# Patient Record
Sex: FEMALE | Race: WHITE | NOT HISPANIC OR LATINO | Employment: FULL TIME | URBAN - NONMETROPOLITAN AREA
[De-identification: names, ages, dates, MRNs, and addresses within clinical notes are randomized per-mention and may not be internally consistent; named-entity substitution may affect disease eponyms.]

---

## 2017-10-03 ENCOUNTER — OFFICE VISIT (OUTPATIENT)
Dept: FAMILY MEDICINE | Facility: OTHER | Age: 22
End: 2017-10-03
Attending: FAMILY MEDICINE
Payer: COMMERCIAL

## 2017-10-03 VITALS
DIASTOLIC BLOOD PRESSURE: 56 MMHG | BODY MASS INDEX: 17.56 KG/M2 | WEIGHT: 109.25 LBS | HEIGHT: 66 IN | SYSTOLIC BLOOD PRESSURE: 104 MMHG | TEMPERATURE: 98 F | OXYGEN SATURATION: 100 % | HEART RATE: 97 BPM

## 2017-10-03 DIAGNOSIS — Z23 NEED FOR PROPHYLACTIC VACCINATION AND INOCULATION AGAINST INFLUENZA: Primary | ICD-10-CM

## 2017-10-03 DIAGNOSIS — B35.4 TINEA CORPORIS: ICD-10-CM

## 2017-10-03 DIAGNOSIS — Z00.00 ROUTINE GENERAL MEDICAL EXAMINATION AT A HEALTH CARE FACILITY: ICD-10-CM

## 2017-10-03 DIAGNOSIS — D22.9 MULTIPLE ATYPICAL NEVI: ICD-10-CM

## 2017-10-03 DIAGNOSIS — N76.0 ABSCESS OF VAGINA: ICD-10-CM

## 2017-10-03 DIAGNOSIS — N94.3 PREMENSTRUAL SYNDROME: ICD-10-CM

## 2017-10-03 PROCEDURE — 90471 IMMUNIZATION ADMIN: CPT | Performed by: FAMILY MEDICINE

## 2017-10-03 PROCEDURE — 99395 PREV VISIT EST AGE 18-39: CPT | Mod: 25 | Performed by: FAMILY MEDICINE

## 2017-10-03 PROCEDURE — 90686 IIV4 VACC NO PRSV 0.5 ML IM: CPT | Performed by: FAMILY MEDICINE

## 2017-10-03 RX ORDER — MULTIVITAMIN WITH MINERALS
TABLET ORAL
Qty: 540 TABLET | Refills: 3 | Status: SHIPPED | OUTPATIENT
Start: 2017-10-03 | End: 2019-05-23

## 2017-10-03 RX ORDER — KETOCONAZOLE 20 MG/G
CREAM TOPICAL 2 TIMES DAILY
Qty: 15 G | Refills: 0 | Status: SHIPPED | OUTPATIENT
Start: 2017-10-03 | End: 2019-05-23

## 2017-10-03 RX ORDER — ERGOCALCIFEROL 1.25 MG/1
50000 CAPSULE, LIQUID FILLED ORAL
Qty: 8 CAPSULE | Refills: 0 | Status: SHIPPED | OUTPATIENT
Start: 2017-10-03 | End: 2017-11-22

## 2017-10-03 ASSESSMENT — ANXIETY QUESTIONNAIRES
2. NOT BEING ABLE TO STOP OR CONTROL WORRYING: NOT AT ALL
GAD7 TOTAL SCORE: 0
3. WORRYING TOO MUCH ABOUT DIFFERENT THINGS: NOT AT ALL
5. BEING SO RESTLESS THAT IT IS HARD TO SIT STILL: NOT AT ALL
1. FEELING NERVOUS, ANXIOUS, OR ON EDGE: NOT AT ALL
4. TROUBLE RELAXING: NOT AT ALL
IF YOU CHECKED OFF ANY PROBLEMS ON THIS QUESTIONNAIRE, HOW DIFFICULT HAVE THESE PROBLEMS MADE IT FOR YOU TO DO YOUR WORK, TAKE CARE OF THINGS AT HOME, OR GET ALONG WITH OTHER PEOPLE: NOT DIFFICULT AT ALL
7. FEELING AFRAID AS IF SOMETHING AWFUL MIGHT HAPPEN: NOT AT ALL
6. BECOMING EASILY ANNOYED OR IRRITABLE: NOT AT ALL

## 2017-10-03 ASSESSMENT — PATIENT HEALTH QUESTIONNAIRE - PHQ9: SUM OF ALL RESPONSES TO PHQ QUESTIONS 1-9: 0

## 2017-10-03 NOTE — NURSING NOTE
"Chief Complaint   Patient presents with     Physical       Initial /56 (BP Location: Right arm, Patient Position: Chair, Cuff Size: Adult Regular)  Pulse 97  Temp 98  F (36.7  C)  Ht 5' 5.75\" (1.67 m)  Wt 109 lb 4 oz (49.6 kg)  LMP 09/20/2017 (Approximate)  SpO2 100%  BMI 17.77 kg/m2 Estimated body mass index is 17.77 kg/(m^2) as calculated from the following:    Height as of this encounter: 5' 5.75\" (1.67 m).    Weight as of this encounter: 109 lb 4 oz (49.6 kg).  Medication Reconciliation: complete     Ludy Kelley    "

## 2017-10-03 NOTE — PROGRESS NOTES
Injectable Influenza Immunization Documentation    1.  Is the person to be vaccinated sick today?   No    2. Does the person to be vaccinated have an allergy to a component   of the vaccine?   No    3. Has the person to be vaccinated ever had a serious reaction   to influenza vaccine in the past?   No    4. Has the person to be vaccinated ever had Guillain-Barré syndrome?   No    Form completed by Ludy Kelley            SUBJECTIVE:   CC: Evangelina Otero is an 22 year old woman who presents for preventive health visit.     Healthy Habits:    Do you get at least three servings of calcium containing foods daily (dairy, green leafy vegetables, etc.)? yes    Amount of exercise or daily activities, outside of work: no exercise, sometimes will walk but not much lately    Problems taking medications regularly No    Medication side effects: No    Have you had an eye exam in the past two years? unknown    Do you see a dentist twice per year? yes    Do you have sleep apnea, excessive snoring or daytime drowsiness?no          Rash       Duration: 4-5 mos    Description (location/character/radiation): abdomen    Intensity:  mild    Accompanying signs and symptoms: changing    History (similar episodes/previous evaluation): None    Precipitating or alleviating factors: None    Therapies tried and outcome: None         Today's PHQ-2 Score: No flowsheet data found.      Abuse: Current or Past(Physical, Sexual or Emotional)- No  Do you feel safe in your environment - No  Social History   Substance Use Topics     Smoking status: Never Smoker     Smokeless tobacco: Never Used     Alcohol use No     The patient does not drink >3 drinks per day nor >7 drinks per week.    Reviewed orders with patient.  Reviewed health maintenance and updated orders accordingly - Yes  Labs reviewed in EPIC  BP Readings from Last 3 Encounters:   10/03/17 104/56   06/21/16 116/70   06/02/16 110/60    Wt Readings from Last 3 Encounters:   10/03/17 109 lb  4 oz (49.6 kg)   06/21/16 105 lb (47.6 kg)   06/02/16 110 lb (49.9 kg)                  Patient Active Problem List   Diagnosis     Hypovitaminosis D     ACP (advance care planning)     Multiple atypical nevi     Routine general medical examination at a health care facility     Premenstrual syndrome     Past Surgical History:   Procedure Laterality Date     FOOT SURGERY  [id number]    bunions/hammer toes/reconstruction to right foot     SIGMOIDOSCOPY,DIAGNOSTIC  03/16/2007     UPPER GI ENDOSCOPY  03/16/2007       Social History   Substance Use Topics     Smoking status: Never Smoker     Smokeless tobacco: Never Used     Alcohol use No     Family History   Problem Relation Age of Onset     Asthma Mother      GASTROINTESTINAL DISEASE Mother      ulcers     Immunodeficiency Mother      fibromyalgia     Allergies Father      Cardiovascular Paternal Grandfather      Liver Disease Paternal Grandfather      CANCER Maternal Grandmother      ovarian?     Lung Cancer Maternal Grandfather      +tobacco     Family History Negative Sister      Family History Negative Paternal Grandmother          Current Outpatient Prescriptions   Medication Sig Dispense Refill     chlorhexidine (HIBICLENS) 4 % liquid Apply daily to affected area 236 mL 11     Multiple Vitamins-Minerals (OPTIVITE P.M.T.) TABS Take 3 tablets PO  tablet 3     ketoconazole (NIZORAL) 2 % cream Apply topically 2 times daily Use one week beyond resolution 15 g 0     vitamin D (ERGOCALCIFEROL) 29774 UNIT capsule Take 1 capsule (50,000 Units) by mouth every 7 days for 8 doses 8 capsule 0     Cholecalciferol (VITAMIN D) 1000 UNITS capsule Take 1 capsule by mouth daily 4 times weekly             Mammogram not appropriate for this patient based on age.    Pertinent mammograms are reviewed under the imaging tab.  History of abnormal Pap smear: NO - age 21-29 PAP every 3 years recommended    Reviewed and updated as needed this visit by clinical staffTobacco  Allergies  " Meds  Med Hx  Surg Hx  Fam Hx  Soc Hx        Reviewed and updated as needed this visit by Provider        Past Medical History:   Diagnosis Date     Chronic abdominal pain     Probable IBS     Chronic urticaria      Contact dermatitis and other eczema, due to unspecified cause      Hypermetropia     minimum hyperopia. Does not need glasses     Unspecified constipation      Vomiting alone     Recurrent abdominal problems. Milk free diet. Tigan suppositories given.      Past Surgical History:   Procedure Laterality Date     FOOT SURGERY  [id number]    bunions/hammer toes/reconstruction to right foot     SIGMOIDOSCOPY,DIAGNOSTIC  2007     UPPER GI ENDOSCOPY  2007     Obstetric History       T0      L0     SAB0   TAB0   Ectopic0   Multiple0   Live Births0           ROS:  C: NEGATIVE for fever, chills, change in weight  I: NEGATIVE for worrisome rashes, moles or lesions  E: NEGATIVE for vision changes or irritation  ENT: NEGATIVE for ear, mouth and throat problems  R: NEGATIVE for significant cough or SOB  B: NEGATIVE for masses, tenderness or discharge  CV: NEGATIVE for chest pain, palpitations or peripheral edema  GI: NEGATIVE for nausea, abdominal pain, heartburn, or change in bowel habits  : NEGATIVE for unusual urinary or vaginal symptoms. Periods are regular.  M: NEGATIVE for significant arthralgias or myalgia  N: NEGATIVE for weakness, dizziness or paresthesias  P: NEGATIVE for changes in mood or affect    OBJECTIVE:   /56 (BP Location: Right arm, Patient Position: Chair, Cuff Size: Adult Regular)  Pulse 97  Temp 98  F (36.7  C)  Ht 5' 5.75\" (1.67 m)  Wt 109 lb 4 oz (49.6 kg)  LMP 2017 (Approximate)  SpO2 100%  BMI 17.77 kg/m2  EXAM:  GENERAL: healthy, alert and no distress  EYES: Eyes grossly normal to inspection, PERRL and conjunctivae and sclerae normal  HENT: ear canals and TM's normal, nose and mouth without ulcers or lesions  NECK: no adenopathy, no " "asymmetry, masses, or scars and thyroid normal to palpation  RESP: lungs clear to auscultation - no rales, rhonchi or wheezes  BREAST: normal without masses, tenderness or nipple discharge and no palpable axillary masses or adenopathy  CV: regular rate and rhythm, normal S1 S2, no S3 or S4, no murmur, click or rub, no peripheral edema and peripheral pulses strong  ABDOMEN: soft, nontender, no hepatosplenomegaly, no masses and bowel sounds normal  MS: no gross musculoskeletal defects noted, no edema  SKIN: no suspicious lesions or rashes  NEURO: Normal strength and tone, mentation intact and speech normal  PSYCH: mentation appears normal, affect normal/bright    ASSESSMENT/PLAN:   1. Need for prophylactic vaccination and inoculation against influenza    - HC FLU VAC PRESRV FREE QUAD SPLIT VIR 3+YRS IM  - Vaccine Administration, Initial [55309]    2. Premenstrual syndrome    - Multiple Vitamins-Minerals (OPTIVITE P.M.T.) TABS; Take 3 tablets PO BID  Dispense: 540 tablet; Refill: 3  - vitamin D (ERGOCALCIFEROL) 97243 UNIT capsule; Take 1 capsule (50,000 Units) by mouth every 7 days for 8 doses  Dispense: 8 capsule; Refill: 0    3. Abscess of vagina    - chlorhexidine (HIBICLENS) 4 % liquid; Apply daily to affected area  Dispense: 236 mL; Refill: 11    4. Tinea corporis    - ketoconazole (NIZORAL) 2 % cream; Apply topically 2 times daily Use one week beyond resolution  Dispense: 15 g; Refill: 0    5. Routine general medical examination at a health care facility      6. Multiple atypical nevi        COUNSELING:   Reviewed preventive health counseling, as reflected in patient instructions  Special attention given to:        Regular exercise       Healthy diet/nutrition       Vision screening       Hearing screening         reports that she has never smoked. She has never used smokeless tobacco.    Estimated body mass index is 17.77 kg/(m^2) as calculated from the following:    Height as of this encounter: 5' 5.75\" (1.67 " m).    Weight as of this encounter: 109 lb 4 oz (49.6 kg).         Counseling Resources:  ATP IV Guidelines  Pooled Cohorts Equation Calculator  Breast Cancer Risk Calculator  FRAX Risk Assessment  ICSI Preventive Guidelines  Dietary Guidelines for Americans, 2010  USDA's MyPlate  ASA Prophylaxis  Lung CA Screening    Meena Wright MD  [facility]

## 2017-10-03 NOTE — MR AVS SNAPSHOT
After Visit Summary   10/3/2017    Evangelina Otero    MRN: [id number]           Patient Information     Date Of Birth          1995        Visit Information        Provider Department      10/3/2017 1:30 PM Meena Wright MD [facility]        Today's Diagnoses     Need for prophylactic vaccination and inoculation against influenza    -  1    Premenstrual syndrome        Abscess of vagina        Tinea corporis        Routine general medical examination at a health care facility        Multiple atypical nevi          Care Instructions      Preventive Health Recommendations  Female Ages 18 to 25     Yearly exam:     See your health care provider every year in order to  o Review health changes.   o Discuss preventive care.    o Review your medicines if your doctor has prescribed any.      You should be tested each year for STDs (sexually transmitted diseases).       After age 20, talk to your provider about how often you should have cholesterol testing.      Starting at age 21, get a Pap test every three years. If you have an abnormal result, your doctor may have you test more often.      If you are at risk for diabetes, you should have a diabetes test (fasting glucose).     Shots:     Get a flu shot each year.     Get a tetanus shot every 10 years.     Consider getting the shot (vaccine) that prevents cervical cancer (Gardasil).    Nutrition:     Eat at least 5 servings of fruits and vegetables each day.    Eat whole-grain bread, whole-wheat pasta and brown rice instead of white grains and rice.    Talk to your provider about Calcium and Vitamin D.     Lifestyle    Exercise at least 150 minutes a week each week (30 minutes a day, 5 days a week). This will help you control your weight and prevent disease.    Limit alcohol to one drink per day.    No smoking.     Wear sunscreen to prevent skin cancer.    See your dentist every six months for an exam and cleaning.          Follow-ups  "after your visit        Your next 10 appointments already scheduled     Oct 17, 2017 11:30 AM CDT   (Arrive by 11:15 AM)   PROCEDURE with Meena Wright MD   [facility] [place] ([facility] [place] )    [address]  [place] [address]   [phone]            2018  2:15 PM CST   (Arrive by 2:00 PM)   SHORT with Meena Wright MD   [facility] [place] ([facility] [place] )    [address]  [place] [address]   [phone]              Who to contact     If you have questions or need follow up information about today's clinic visit or your schedule please contact [facility] directly at [phone].  Normal or non-critical lab and imaging results will be communicated to you by MyChart, letter or phone within 4 business days after the clinic has received the results. If you do not hear from us within 7 days, please contact the clinic through MyChart or phone. If you have a critical or abnormal lab result, we will notify you by phone as soon as possible.  Submit refill requests through [facility] or call your pharmacy and they will forward the refill request to us. Please allow 3 business days for your refill to be completed.          Additional Information About Your Visit        [employer] Information     [facility] lets you send messages to your doctor, view your test results, renew your prescriptions, schedule appointments and more. To sign up, go to [email or web address] . Click on \"Log in\" on the left side of the screen, which will take you to the Welcome page. Then click on \"Sign up Now\" on the right side of the page.     You will be asked to enter the access code listed below, as well as some personal information. Please follow the directions to create your username and password.     Your access code is: [email or web address]  Expires: 2018  2:07 PM     Your access code will  in 90 days. If you need help or a new code, please call your " "[facility] or [phone].        Care EveryWhere ID     This is your Care EveryWhere ID. This could be used by other organizations to access your [facility] medical records  [id number]        Your Vitals Were     Pulse Temperature Height Last Period Pulse Oximetry BMI (Body Mass Index)    97 98  F (36.7  C) 5' 5.75\" (1.67 m) 09/20/2017 (Approximate) 100% 17.77 kg/m2       Blood Pressure from Last 3 Encounters:   10/03/17 104/56   06/21/16 116/70   06/02/16 110/60    Weight from Last 3 Encounters:   10/03/17 109 lb 4 oz (49.6 kg)   06/21/16 105 lb (47.6 kg)   06/02/16 110 lb (49.9 kg)              We Performed the Following     HC FLU VAC PRESRV FREE QUAD SPLIT VIR 3+YRS IM     Vaccine Administration, Initial [[id number]]          Today's Medication Changes          These changes are accurate as of: 10/3/17  2:07 PM.  If you have any questions, ask your nurse or doctor.               Start taking these medicines.        Dose/Directions    ketoconazole 2 % cream   Commonly known as:  NIZORAL   Used for:  Tinea corporis   Started by:  Meena Wright MD        Apply topically 2 times daily Use one week beyond resolution   Quantity:  15 g   Refills:  0       OPTIVITE P.M.T. Tabs   Used for:  Premenstrual syndrome   Started by:  Meena Wright MD        Take 3 tablets PO BID   Quantity:  540 tablet   Refills:  3       vitamin D 68606 UNIT capsule   Commonly known as:  ERGOCALCIFEROL   Used for:  Premenstrual syndrome   Started by:  Meena Wright MD        Dose:  38999 Units   Take 1 capsule (50,000 Units) by mouth every 7 days for 8 doses   Quantity:  8 capsule   Refills:  0            Where to get your medicines      These medications were sent to [facility] [address]  [address] [place] [address]     Phone:  [phone]     chlorhexidine 4 % liquid    ketoconazole 2 % cream    OPTIVITE P.M.T. Tabs    vitamin D 83504 UNIT capsule                Primary Care " Provider Office Phone # Fax #    Meena Wright [phone]       [facility] [place] [address]  [place] [address]        Equal Access to Services     TY LORENZO : Hadii lalitha ku hadvinicioo Soomaali, waaxda luqadaha, qaybta kaalmada adeegyada, jessica norrisn leisa paz laFrancrichy villela. So [facility] [phone].    ATENCIÓN: Si habla español, tiene a diaz disposición servicios gratuitos de asistencia lingüística. Llame al [phone].    We comply with applicable federal civil rights laws and [place] laws. We do not discriminate on the basis of race, color, national origin, age, disability, sex, sexual orientation, or gender identity.            Thank you!     Thank you for choosing [facility]  for your care. Our goal is always to provide you with excellent care. Hearing back from our patients is one way we can continue to improve our services. Please take a few minutes to complete the written survey that you may receive in the mail after your visit with us. Thank you!             Your Updated Medication List - Protect others around you: Learn how to safely use, store and throw away your medicines at www.disposemymeds.org.          This list is accurate as of: 10/3/17  2:07 PM.  Always use your most recent med list.                   Brand Name Dispense Instructions for use Diagnosis    chlorhexidine 4 % liquid    HIBICLENS    236 mL    Apply daily to affected area    Abscess of vagina       ketoconazole 2 % cream    NIZORAL    15 g    Apply topically 2 times daily Use one week beyond resolution    Tinea corporis       OPTIVITE P.M.T. Tabs     540 tablet    Take 3 tablets PO BID    Premenstrual syndrome       vitamin D 1000 UNITS capsule      Take 1 capsule by mouth daily 4 times weekly        vitamin D 97793 UNIT capsule    ERGOCALCIFEROL    8 capsule    Take 1 capsule (50,000 Units) by mouth every 7 days for 8 doses    Premenstrual syndrome

## 2017-10-04 ASSESSMENT — ANXIETY QUESTIONNAIRES: GAD7 TOTAL SCORE: 0

## 2017-11-26 ENCOUNTER — HEALTH MAINTENANCE LETTER (OUTPATIENT)
Age: 22
End: 2017-11-26

## 2019-05-23 ENCOUNTER — OFFICE VISIT (OUTPATIENT)
Dept: FAMILY MEDICINE | Facility: OTHER | Age: 24
End: 2019-05-23
Attending: FAMILY MEDICINE
Payer: COMMERCIAL

## 2019-05-23 VITALS
BODY MASS INDEX: 17.89 KG/M2 | TEMPERATURE: 97.8 F | WEIGHT: 110 LBS | HEART RATE: 98 BPM | SYSTOLIC BLOOD PRESSURE: 100 MMHG | OXYGEN SATURATION: 99 % | DIASTOLIC BLOOD PRESSURE: 60 MMHG

## 2019-05-23 DIAGNOSIS — L70.0 ACNE VULGARIS: Primary | ICD-10-CM

## 2019-05-23 DIAGNOSIS — Z31.61 PROCREATIVE COUNSELING AND ADVICE USING NATURAL FAMILY PLANNING: ICD-10-CM

## 2019-05-23 DIAGNOSIS — H60.391 INFECTIVE OTITIS EXTERNA, RIGHT: ICD-10-CM

## 2019-05-23 DIAGNOSIS — B00.9 HERPES SIMPLEX VIRUS INFECTION: ICD-10-CM

## 2019-05-23 PROCEDURE — 99214 OFFICE O/P EST MOD 30 MIN: CPT | Performed by: FAMILY MEDICINE

## 2019-05-23 RX ORDER — ACYCLOVIR 400 MG/1
400 TABLET ORAL EVERY 8 HOURS
Qty: 15 TABLET | Refills: 11 | Status: SHIPPED | OUTPATIENT
Start: 2019-05-23 | End: 2019-08-13

## 2019-05-23 RX ORDER — ADAPALENE 0.1 G/100G
CREAM TOPICAL
Qty: 45 G | Refills: 3 | Status: SHIPPED | OUTPATIENT
Start: 2019-05-23 | End: 2019-07-01

## 2019-05-23 RX ORDER — CLINDAMYCIN PHOSPHATE 11.9 MG/ML
SOLUTION TOPICAL 2 TIMES DAILY
Qty: 60 ML | Refills: 3 | Status: SHIPPED | OUTPATIENT
Start: 2019-05-23 | End: 2020-09-16

## 2019-05-23 RX ORDER — CIPROFLOXACIN AND DEXAMETHASONE 3; 1 MG/ML; MG/ML
4 SUSPENSION/ DROPS AURICULAR (OTIC) 2 TIMES DAILY
Qty: 7.5 ML | Refills: 0 | Status: SHIPPED | OUTPATIENT
Start: 2019-05-23 | End: 2019-07-01

## 2019-05-23 ASSESSMENT — PAIN SCALES - GENERAL: PAINLEVEL: NO PAIN (1)

## 2019-05-23 NOTE — PROGRESS NOTES
Subjective     Evangelina Otero is a 24 year old female who presents to clinic today for the following health issues:    HPI   Right ear pain       Duration: 5 days     Description (location/character/radiation): right ear pain described as a constant soreness has a lump present in the ear     Intensity:  mild    Accompanying signs and symptoms: none     History (similar episodes/previous evaluation): None    Precipitating or alleviating factors: None    Therapies tried and outcome: None     Rash      Duration: 4-5 mos    Description  Location: left 3rd digit  Itching: no    Intensity:  moderate    Accompanying signs and symptoms: sharp tingling pain    History (similar episodes/previous evaluation): None    Precipitating or alleviating factors:  New exposures:  None  Recent travel: YES- living in [place]     Therapies tried and outcome: none    Birth Control      Duration: 2 wks    Description (location/character/radiation): getting  in july    Intensity:  mild    Accompanying signs and symptoms: would like to talk about other options    History (similar episodes/previous evaluation): mom had infertility or was told that initially    Precipitating or alleviating factors: none    Therapies tried and outcome: None       Patient Active Problem List   Diagnosis     Hypovitaminosis D     ACP (advance care planning)     Multiple atypical nevi     Routine general medical examination at a health care facility     Premenstrual syndrome     Past Surgical History:   Procedure Laterality Date     FOOT SURGERY  [id number]    bunions/hammer toes/reconstruction to right foot     SIGMOIDOSCOPY,DIAGNOSTIC  03/16/2007     UPPER GI ENDOSCOPY  03/16/2007       Social History     Tobacco Use     Smoking status: Never Smoker     Smokeless tobacco: Never Used   Substance Use Topics     Alcohol use: No     Family History   Problem Relation Age of Onset     Asthma Mother      Gastrointestinal Disease Mother         ulcers      Immunodeficiency Mother         fibromyalgia     Allergies Father      Cardiovascular Paternal Grandfather      Liver Disease Paternal Grandfather      Cancer Maternal Grandmother         ovarian?     Lung Cancer Maternal Grandfather         +tobacco     Family History Negative Sister      Family History Negative Paternal Grandmother          Current Outpatient Medications   Medication Sig Dispense Refill     acyclovir (ZOVIRAX) 400 MG tablet Take 1 tablet (400 mg) by mouth every 8 hours for 5 days 15 tablet 11     adapalene (DIFFERIN) 0.1 % external cream Apply thin layer at night 45 g 3     chlorhexidine (HIBICLENS) 4 % liquid Apply daily to affected area 236 mL 11     ciprofloxacin-dexamethasone (CIPRODEX) 0.3-0.1 % otic suspension Place 4 drops into the right ear 2 times daily 7.5 mL 0     clindamycin (CLEOCIN T) 1 % external solution Apply topically 2 times daily 60 mL 3     Cholecalciferol (VITAMIN D) 1000 UNITS capsule Take 1 capsule by mouth daily 4 times weekly       No Known Allergies  BP Readings from Last 3 Encounters:   05/23/19 100/60   10/03/17 104/56   06/21/16 116/70    Wt Readings from Last 3 Encounters:   05/23/19 49.9 kg (110 lb)   10/03/17 49.6 kg (109 lb 4 oz)   06/21/16 47.6 kg (105 lb)        Reviewed and updated as needed this visit by Provider         Review of Systems   ROS COMP: Constitutional, HEENT, cardiovascular, pulmonary, gi and gu systems are negative, except as otherwise noted.      Objective    /60 (BP Location: Right arm, Patient Position: Chair, Cuff Size: Adult Regular)   Pulse 98   Temp 97.8  F (36.6  C) (Tympanic)   Wt 49.9 kg (110 lb)   LMP 05/12/2019   SpO2 99%   BMI 17.89 kg/m    Body mass index is 17.89 kg/m .  Physical Exam   GENERAL: healthy, alert and no distress  HENT: normal cephalic/atraumatic, right ear: purulent drainage in canal and red and boggy canal, left ear: normal: no effusions, no erythema, normal landmarks, nose and mouth without ulcers or  lesions, oropharynx clear and oral mucous membranes moist  NECK: no adenopathy, no asymmetry, masses, or scars and thyroid normal to palpation  RESP: lungs clear to auscultation - no rales, rhonchi or wheezes  CV: regular rate and rhythm, normal S1 S2, no S3 or S4, no murmur, click or rub, no peripheral edema and peripheral pulses strong  ABDOMEN: soft, nontender, no hepatosplenomegaly, no masses and bowel sounds normal  MS: no gross musculoskeletal defects noted, no edema  SKIN: left 3rd digit reveals multiple vesicles, acne with pustules on face  PSYCH: mentation appears normal, affect normal/bright    Diagnostic Test Results:  Labs reviewed in Epic  No results found for this or any previous visit (from the past 24 hour(s)).        Assessment & Plan     (L70.0) Acne vulgaris  (primary encounter diagnosis)  Comment:   Plan: adapalene (DIFFERIN) 0.1 % external cream,         clindamycin (CLEOCIN T) 1 % external solution        Getting  and wanted to help face clear up  Start optivite PMT    (H60.391) Infective otitis externa, right  Comment:   Plan: ciprofloxacin-dexamethasone (CIPRODEX) 0.3-0.1         % otic suspension          (B00.9) Herpes simplex virus infection  Comment:   Plan: acyclovir (ZOVIRAX) 400 MG tablet        Take prn outbreatk    (Z31.61) Procreative counseling and advice using natural family planning  Comment:   Plan: pamplets given out, discussed with mom and patient.  Follow-up prn     Patient was agreeable to this plan and had no further questions.  There are no Patient Instructions on file for this visit.    No follow-ups on file.    Meena Wright MD  [facility] [place]

## 2019-05-23 NOTE — NURSING NOTE
"Chief Complaint   Patient presents with     Ear Problem     right ear pain        Initial /60 (BP Location: Right arm, Patient Position: Chair, Cuff Size: Adult Regular)   Pulse 98   Temp 97.8  F (36.6  C) (Tympanic)   Wt 49.9 kg (110 lb)   LMP 05/12/2019   SpO2 99%   BMI 17.89 kg/m   Estimated body mass index is 17.89 kg/m  as calculated from the following:    Height as of 10/3/17: 1.67 m (5' 5.75\").    Weight as of this encounter: 49.9 kg (110 lb).  Medication Reconciliation: complete    Candelaria Thompson LPN  "

## 2019-05-31 PROBLEM — Z00.00 ROUTINE GENERAL MEDICAL EXAMINATION AT A HEALTH CARE FACILITY: Status: RESOLVED | Noted: 2017-10-03 | Resolved: 2019-05-31

## 2019-06-28 ENCOUNTER — TELEPHONE (OUTPATIENT)
Dept: FAMILY MEDICINE | Facility: OTHER | Age: 24
End: 2019-06-28

## 2019-06-28 NOTE — TELEPHONE ENCOUNTER
Pt stated she has lump right ear red swollen, size smaller than a dre, tender to touch. Pt advised to go to ER/Urgent care. Pt refuses requests an appointment with PCP. Pt verbalizes understanding of appointment time and advised to call if she is unable to keep her appointment.     Next 5 appointments (look out 90 days)    Jul 01, 2019  2:00 PM CDT  (Arrive by 1:45 PM)  SHORT with Meena Wright MD  [facility] [place] ([facility] ) [address]  [place] [address]  [phone]

## 2019-07-01 ENCOUNTER — OFFICE VISIT (OUTPATIENT)
Dept: FAMILY MEDICINE | Facility: OTHER | Age: 24
End: 2019-07-01
Attending: FAMILY MEDICINE
Payer: COMMERCIAL

## 2019-07-01 VITALS
WEIGHT: 110.6 LBS | TEMPERATURE: 98.8 F | HEART RATE: 88 BPM | BODY MASS INDEX: 17.99 KG/M2 | DIASTOLIC BLOOD PRESSURE: 60 MMHG | RESPIRATION RATE: 20 BRPM | SYSTOLIC BLOOD PRESSURE: 90 MMHG | OXYGEN SATURATION: 99 %

## 2019-07-01 DIAGNOSIS — L70.0 ACNE VULGARIS: Primary | ICD-10-CM

## 2019-07-01 DIAGNOSIS — N76.0 ABSCESS OF VAGINA: ICD-10-CM

## 2019-07-01 DIAGNOSIS — H60.391 INFECTIVE OTITIS EXTERNA, RIGHT: ICD-10-CM

## 2019-07-01 PROCEDURE — 99214 OFFICE O/P EST MOD 30 MIN: CPT | Performed by: FAMILY MEDICINE

## 2019-07-01 RX ORDER — CLINDAMYCIN AND BENZOYL PEROXIDE 10; 50 MG/G; MG/G
GEL TOPICAL 2 TIMES DAILY
Qty: 50 G | Refills: 11 | Status: SHIPPED | OUTPATIENT
Start: 2019-07-01 | End: 2020-09-08

## 2019-07-01 ASSESSMENT — PAIN SCALES - GENERAL: PAINLEVEL: NO PAIN (0)

## 2019-07-01 NOTE — PROGRESS NOTES
Subjective     Evangelina Otero is a 24 year old female who presents to clinic today for the following health issues:    HPI   Ear problem      Duration: since 5-19    Description (location/character/radiation): right ear- lump in the ear. Came in on 5-23 and outter ear infection was determined. Lump hasn't gone away    Accompanying signs and symptoms: Hurts to touch it    History (similar episodes/previous evaluation): None    Precipitating or alleviating factors: None    Therapies tried and outcome: Ciprodex ear drops, differin gel      Rash      Duration: few years, worse the last few weeks    Description  Location: face, ?ear?  Itching: moderate    Intensity:  mild    Accompanying signs and symptoms: ear pain    History (similar episodes/previous evaluation): tried ciprodex and swelling went down    Precipitating or alleviating factors:  New exposures:  None  Recent travel: no      Therapies tried and outcome: ciprodex, differin -- slightly worse      Patient Active Problem List   Diagnosis     Hypovitaminosis D     ACP (advance care planning)     Multiple atypical nevi     Premenstrual syndrome     Infective otitis externa, right     Acne vulgaris     Past Surgical History:   Procedure Laterality Date     FOOT SURGERY  [id number]    bunions/hammer toes/reconstruction to right foot     SIGMOIDOSCOPY,DIAGNOSTIC  03/16/2007     UPPER GI ENDOSCOPY  03/16/2007       Social History     Tobacco Use     Smoking status: Never Smoker     Smokeless tobacco: Never Used   Substance Use Topics     Alcohol use: No     Family History   Problem Relation Age of Onset     Asthma Mother      Gastrointestinal Disease Mother         ulcers     Immunodeficiency Mother         fibromyalgia     Allergies Father      Cardiovascular Paternal Grandfather      Liver Disease Paternal Grandfather      Cancer Maternal Grandmother         ovarian?     Lung Cancer Maternal Grandfather         +tobacco     Family History Negative Sister      Family  History Negative Paternal Grandmother          Current Outpatient Medications   Medication Sig Dispense Refill     chlorhexidine (HIBICLENS) 4 % liquid Apply daily to affected area 236 mL 11     Cholecalciferol (VITAMIN D) 1000 UNITS capsule Take 1 capsule by mouth daily 4 times weekly       clindamycin (CLEOCIN T) 1 % external solution Apply topically 2 times daily 60 mL 3     clindamycin-benzoyl peroxide (BENZACLIN) 1-5 % external gel Apply topically 2 times daily 50 g 11     acyclovir (ZOVIRAX) 400 MG tablet Take 1 tablet (400 mg) by mouth every 8 hours for 5 days 15 tablet 11     No Known Allergies  BP Readings from Last 3 Encounters:   07/01/19 90/60   05/23/19 100/60   10/03/17 104/56    Wt Readings from Last 3 Encounters:   07/01/19 50.2 kg (110 lb 9.6 oz)   05/23/19 49.9 kg (110 lb)   10/03/17 49.6 kg (109 lb 4 oz)      Reviewed and updated as needed this visit by Provider         Review of Systems   ROS COMP: Constitutional, HEENT, cardiovascular, pulmonary, gi and gu systems are negative, except as otherwise noted.      Objective    BP 90/60 (BP Location: Left arm, Patient Position: Chair, Cuff Size: Adult Regular)   Pulse 88   Temp 98.8  F (37.1  C) (Tympanic)   Resp 20   Wt 50.2 kg (110 lb 9.6 oz)   SpO2 99%   BMI 17.99 kg/m    Body mass index is 17.99 kg/m .  Physical Exam   GENERAL: healthy, alert and no distress  HENT: ear canals reveal small pustules and erythema, tender to otoscope and TM's normal, nose and mouth without ulcers or lesions  NECK: no adenopathy, no asymmetry, masses, or scars and thyroid normal to palpation  RESP: lungs clear to auscultation - no rales, rhonchi or wheezes  CV: regular rate and rhythm, normal S1 S2, no S3 or S4, no murmur, click or rub, no peripheral edema and peripheral pulses strong  MS: no gross musculoskeletal defects noted, no edema  SKIN: acne  PSYCH: mentation appears normal, affect normal/bright    Diagnostic Test Results:  Labs reviewed in Epic  Results  for orders placed or performed in visit on 11/25/15   Wound Culture Aerobic Bacterial   Result Value Ref Range    Specimen Description Labia     Culture Micro (A)      Light growth Coagulase negative Staphylococcus No further identification or   sensitivity done  Light growth Diphtheroids No further identification or sensitivity done      Micro Report Status FINAL 11/29/2015    Gram stain   Result Value Ref Range    Specimen Description Labia     Gram Stain Rare PMNs seen  No organisms seen       Micro Report Status FINAL 11/26/2015            Assessment & Plan     (L70.0) Acne vulgaris  (primary encounter diagnosis)  Comment:   Plan: clindamycin-benzoyl peroxide (BENZACLIN) 1-5 %         external gel, OTOLARYNGOLOGY REFERRAL        Stop differin although I wonder if it just isn't long enough  Avoid sugar, start vitamins    (H60.391) Infective otitis externa, right  Comment:   Plan: OTOLARYNGOLOGY REFERRAL        ? Acne in her right ear canal    (N76.0) Abscess of vagina  Comment:   Plan: chlorhexidine (HIBICLENS) 4 % liquid        Use this in her ear as well       Patient was agreeable to this plan and had no further questions.  Patient Instructions   1. optivite PMT 3 tab/cap 2x/day  2.  Call Risa!!!      No follow-ups on file.    Meena Wright MD  [facility]

## 2019-07-01 NOTE — NURSING NOTE
"Chief Complaint   Patient presents with     Otalgia       Initial BP 90/60 (BP Location: Left arm, Patient Position: Chair, Cuff Size: Adult Regular)   Pulse 88   Temp 98.8  F (37.1  C) (Tympanic)   Resp 20   Wt 50.2 kg (110 lb 9.6 oz)   SpO2 99%   BMI 17.99 kg/m   Estimated body mass index is 17.99 kg/m  as calculated from the following:    Height as of 10/3/17: 1.67 m (5' 5.75\").    Weight as of this encounter: 50.2 kg (110 lb 9.6 oz).  Medication Reconciliation: complete   Lacy Iraheta LPN      "

## 2019-08-13 ENCOUNTER — OFFICE VISIT (OUTPATIENT)
Dept: OTOLARYNGOLOGY | Facility: OTHER | Age: 24
End: 2019-08-13
Attending: FAMILY MEDICINE
Payer: COMMERCIAL

## 2019-08-13 VITALS
DIASTOLIC BLOOD PRESSURE: 60 MMHG | HEART RATE: 99 BPM | OXYGEN SATURATION: 99 % | SYSTOLIC BLOOD PRESSURE: 100 MMHG | TEMPERATURE: 97.4 F | BODY MASS INDEX: 17.89 KG/M2 | WEIGHT: 110 LBS

## 2019-08-13 DIAGNOSIS — L08.9 LOCALIZED INFECTION OF SKIN: Primary | ICD-10-CM

## 2019-08-13 DIAGNOSIS — H60.391 INFECTIVE OTITIS EXTERNA, RIGHT: ICD-10-CM

## 2019-08-13 PROCEDURE — 99213 OFFICE O/P EST LOW 20 MIN: CPT | Performed by: PHYSICIAN ASSISTANT

## 2019-08-13 RX ORDER — MUPIROCIN 20 MG/G
OINTMENT TOPICAL
Qty: 22 G | Refills: 1 | Status: SHIPPED | OUTPATIENT
Start: 2019-08-13 | End: 2020-09-16

## 2019-08-13 ASSESSMENT — PAIN SCALES - GENERAL: PAINLEVEL: NO PAIN (0)

## 2019-08-13 NOTE — NURSING NOTE
"Chief Complaint   Patient presents with     Referral     Dr Wright Referral  Ineffective OE, Right       Initial /60   Pulse 99   Temp 97.4  F (36.3  C) (Tympanic)   Wt 49.9 kg (110 lb)   SpO2 99%   BMI 17.89 kg/m   Estimated body mass index is 17.89 kg/m  as calculated from the following:    Height as of 10/3/17: 1.67 m (5' 5.75\").    Weight as of this encounter: 49.9 kg (110 lb).  Medication Reconciliation: complete  "

## 2019-08-13 NOTE — PATIENT INSTRUCTIONS
Start distilled white vinegar/ distilled water- Use 50/50 mixture. Clean area 2 times daily. Then pat dry   Apply Bactroban twice a day for 5-7 days to site. Then use as needed.   If it does return- contact nurse and consider removal.       Thank you for allowing EMILY Garvey and our ENT team to participate in your care.  If your medications are too expensive, please give the nurse a call.  We can possibly change this medication.  If you have a scheduling or an appointment question please contact our Health Unit Coordinator at their direct line [phone].   ALL nursing questions or concerns can be directed to your ENT nurse at: [phone]--Andreea

## 2019-08-13 NOTE — PROGRESS NOTES
Otolaryngology Consultation    Patient: Evangelina Otero  : 1995    Chief Complaint   Patient presents with     Referral     Dr Wright Referral  Ineffective OE, Right       HPI:  Evangelina Otero is a 24 year old female seen today for ear concerns. She was last seen by PCP on 19 for concerns of lump in her right ear. She had symptoms for several weeks now. Reports it presented in canal on . She was seen on  and treated for OE. Treated with Ciprodex w/o improvement.   She does mention a lump in canal, and felt the ear canals swollen.  She was treated for OE and the swelling resolved. She had continued lump present that occurred with pressure.       She c/o tenderness with palpation. She has felt the lump remains.   No active otorrhea.  Hearing is stable. Reports no hearing concerns at this time.   No hx of COM or otologic surgeries  Denies vertigo.         Current Outpatient Rx   Medication Sig Dispense Refill     acyclovir (ZOVIRAX) 400 MG tablet Take 1 tablet (400 mg) by mouth every 8 hours for 5 days 15 tablet 11     chlorhexidine (HIBICLENS) 4 % liquid Apply daily to affected area 236 mL 11     Cholecalciferol (VITAMIN D) 1000 UNITS capsule Take 1 capsule by mouth daily 4 times weekly       clindamycin (CLEOCIN T) 1 % external solution Apply topically 2 times daily 60 mL 3     clindamycin-benzoyl peroxide (BENZACLIN) 1-5 % external gel Apply topically 2 times daily 50 g 11       Allergies: Patient has no known allergies.     Past Medical History:   Diagnosis Date     Chronic abdominal pain     Probable IBS     Chronic urticaria      Contact dermatitis and other eczema, due to unspecified cause      Hypermetropia     minimum hyperopia. Does not need glasses     Unspecified constipation      Vomiting alone     Recurrent abdominal problems. Milk free diet. Tigan suppositories given.       Past Surgical History:   Procedure Laterality Date     FOOT SURGERY  [id number]    bunions/hammer toes/reconstruction  to right foot     SIGMOIDOSCOPY,DIAGNOSTIC  03/16/2007     UPPER GI ENDOSCOPY  03/16/2007       ENT family history reviewed    Social History     Tobacco Use     Smoking status: Never Smoker     Smokeless tobacco: Never Used   Substance Use Topics     Alcohol use: No     Drug use: No       Review of Systems  ROS: 10 point ROS neg other than the symptoms noted above in the HPI     Physical Exam  /60   Pulse 99   Temp 97.4  F (36.3  C) (Tympanic)   Wt 49.9 kg (110 lb)   SpO2 99%   BMI 17.89 kg/m    General - The patient is well nourished and well developed, and appears to have good nutritional status.  Alert and oriented to person and place, answers questions and cooperates with examination appropriately.   Head and Face - Normocephalic and atraumatic, with no gross asymmetry noted.  The facial nerve is intact, with strong symmetric movements.  Voice and Breathing - The patient was breathing comfortably without the use of accessory muscles. There was no wheezing, stridor, or stertor.  The patients voice was clear and strong, and had appropriate pitch and quality.  Ears -The external auditory canals are patent, the tympanic membranes are intact without effusion, retraction or mass.  Bony landmarks are intact.  Right outer ear, anti tragal area with small inflamed site. There is a crusting which was removed, appears to have drained. Scattered white comedones     Mouth - Examination of the oral cavity showed pink, healthy oral mucosa. No lesions or ulcerations noted.  The tongue was mobile and midline, and the dentition were in good condition.    Throat - The walls of the oropharynx were smooth, pink, moist, symmetric, and had no lesions or ulcerations.  The tonsillar pillars and soft palate were symmetric.  The uvula was midline on elevation.    Neck - Normal midline excursion of the laryngotracheal complex during swallowing.  Full range of motion on passive movement.  Palpation of the occipital, submental,  submandibular, internal jugular chain, and supraclavicular nodes did not demonstrate any abnormal lymph nodes or masses.  Palpation of the thyroid was soft and smooth, with no nodules or goiter appreciated.  The trachea was mobile and midline.      ASSESSMENT:    ICD-10-CM    1. Localized infection of skin L08.9    2. Infective otitis externa, right H60.391 mupirocin (BACTROBAN) 2 % external ointment     Clean area BID for 7 days  Apply Bactroban to site for 5-7 days  Allow area to heal.     If swelling does recur- consider excision of local skin swelling. I would suspect an infected comedone developed.     She agrees with this plan.       Phyllis Dhillon PA-C  ENT  [facility], [place]  [phone]

## 2019-08-13 NOTE — LETTER
2019         RE: Evangelina Otero  [address] [place] [address]        Dear Colleague,    Thank you for referring your patient, Evangelina Otero, to the [facility]. Please see a copy of my visit note below.    Otolaryngology Consultation    Patient: Evangelina Otero  : 1995    Chief Complaint   Patient presents with     Referral     Dr Wright Referral  Ineffective OE, Right       HPI:  Evangelina Otero is a 24 year old female seen today for ear concerns. She was last seen by PCP on 19 for concerns of lump in her right ear. She had symptoms for several weeks now. Reports it presented in canal on . She was seen on  and treated for OE. Treated with Ciprodex w/o improvement.   She does mention a lump in canal, and felt the ear canals swollen.  She was treated for OE and the swelling resolved. She had continued lump present that occurred with pressure.       She c/o tenderness with palpation. She has felt the lump remains.   No active otorrhea.  Hearing is stable. Reports no hearing concerns at this time.   No hx of COM or otologic surgeries  Denies vertigo.         Current Outpatient Rx   Medication Sig Dispense Refill     acyclovir (ZOVIRAX) 400 MG tablet Take 1 tablet (400 mg) by mouth every 8 hours for 5 days 15 tablet 11     chlorhexidine (HIBICLENS) 4 % liquid Apply daily to affected area 236 mL 11     Cholecalciferol (VITAMIN D) 1000 UNITS capsule Take 1 capsule by mouth daily 4 times weekly       clindamycin (CLEOCIN T) 1 % external solution Apply topically 2 times daily 60 mL 3     clindamycin-benzoyl peroxide (BENZACLIN) 1-5 % external gel Apply topically 2 times daily 50 g 11       Allergies: Patient has no known allergies.     Past Medical History:   Diagnosis Date     Chronic abdominal pain     Probable IBS     Chronic urticaria      Contact dermatitis and other eczema, due to unspecified cause      Hypermetropia     minimum hyperopia. Does not need glasses      Unspecified constipation      Vomiting alone     Recurrent abdominal problems. Milk free diet. Tigan suppositories given.       Past Surgical History:   Procedure Laterality Date     FOOT SURGERY  [id number]    bunions/hammer toes/reconstruction to right foot     SIGMOIDOSCOPY,DIAGNOSTIC  03/16/2007     UPPER GI ENDOSCOPY  03/16/2007       ENT family history reviewed    Social History     Tobacco Use     Smoking status: Never Smoker     Smokeless tobacco: Never Used   Substance Use Topics     Alcohol use: No     Drug use: No       Review of Systems  ROS: 10 point ROS neg other than the symptoms noted above in the HPI     Physical Exam  /60   Pulse 99   Temp 97.4  F (36.3  C) (Tympanic)   Wt 49.9 kg (110 lb)   SpO2 99%   BMI 17.89 kg/m     General - The patient is well nourished and well developed, and appears to have good nutritional status.  Alert and oriented to person and place, answers questions and cooperates with examination appropriately.   Head and Face - Normocephalic and atraumatic, with no gross asymmetry noted.  The facial nerve is intact, with strong symmetric movements.  Voice and Breathing - The patient was breathing comfortably without the use of accessory muscles. There was no wheezing, stridor, or stertor.  The patients voice was clear and strong, and had appropriate pitch and quality.  Ears -The external auditory canals are patent, the tympanic membranes are intact without effusion, retraction or mass.  Bony landmarks are intact.  Right outer ear, anti tragal area with small inflamed site. There is a crusting which was removed, appears to have drained. Scattered white comedones     Mouth - Examination of the oral cavity showed pink, healthy oral mucosa. No lesions or ulcerations noted.  The tongue was mobile and midline, and the dentition were in good condition.    Throat - The walls of the oropharynx were smooth, pink, moist, symmetric, and had no lesions or ulcerations.  The tonsillar  pillars and soft palate were symmetric.  The uvula was midline on elevation.    Neck - Normal midline excursion of the laryngotracheal complex during swallowing.  Full range of motion on passive movement.  Palpation of the occipital, submental, submandibular, internal jugular chain, and supraclavicular nodes did not demonstrate any abnormal lymph nodes or masses.  Palpation of the thyroid was soft and smooth, with no nodules or goiter appreciated.  The trachea was mobile and midline.      ASSESSMENT:    ICD-10-CM    1. Localized infection of skin L08.9    2. Infective otitis externa, right H60.391 mupirocin (BACTROBAN) 2 % external ointment     Clean area BID for 7 days  Apply Bactroban to site for 5-7 days  Allow area to heal.     If swelling does recur- consider excision of local skin swelling. I would suspect an infected comedone developed.     She agrees with this plan.       Phyllis Dhillon PA-C  ENT  [facility], [place]  [phone]      Again, thank you for allowing me to participate in the care of your patient.        Sincerely,        Phyllis Dhillon PA-C

## 2020-09-08 DIAGNOSIS — L70.0 ACNE VULGARIS: ICD-10-CM

## 2020-09-08 RX ORDER — CLINDAMYCIN AND BENZOYL PEROXIDE 10; 50 MG/G; MG/G
GEL TOPICAL 2 TIMES DAILY
Qty: 50 G | Refills: 3 | Status: SHIPPED | OUTPATIENT
Start: 2020-09-08 | End: 2022-08-10

## 2020-09-08 NOTE — TELEPHONE ENCOUNTER
clindamycin-benzoyl peroxide (BENZACLIN) 1-5 % external gel       Last Written Prescription Date:  07/01/19  Last Fill Quantity: 50 g,   # refills: 11  Last Office Visit: 07/01/19  Future Office visit:    Next 5 appointments (look out 90 days)    Oct 07, 2020 11:00 AM CDT  (Arrive by 10:45 AM)  SHORT with Meena Wright MD  [facility] [place] ([facility] [place] ) [address]  [place] [address]  [phone]

## 2020-09-16 ENCOUNTER — OFFICE VISIT (OUTPATIENT)
Dept: PODIATRY | Facility: OTHER | Age: 25
End: 2020-09-16
Attending: PODIATRIST
Payer: COMMERCIAL

## 2020-09-16 ENCOUNTER — ANCILLARY PROCEDURE (OUTPATIENT)
Dept: GENERAL RADIOLOGY | Facility: OTHER | Age: 25
End: 2020-09-16
Attending: PODIATRIST
Payer: COMMERCIAL

## 2020-09-16 VITALS
RESPIRATION RATE: 14 BRPM | TEMPERATURE: 97.4 F | DIASTOLIC BLOOD PRESSURE: 68 MMHG | HEART RATE: 103 BPM | HEIGHT: 67 IN | OXYGEN SATURATION: 98 % | SYSTOLIC BLOOD PRESSURE: 100 MMHG | BODY MASS INDEX: 18.05 KG/M2 | WEIGHT: 115 LBS

## 2020-09-16 DIAGNOSIS — M20.42 HAMMER TOE OF LEFT FOOT: ICD-10-CM

## 2020-09-16 DIAGNOSIS — Q66.70 CONGENITAL PES CAVUS: ICD-10-CM

## 2020-09-16 DIAGNOSIS — M79.671 RIGHT FOOT PAIN: ICD-10-CM

## 2020-09-16 DIAGNOSIS — G57.82 NERVE ENTRAPMENT OF LOWER LIMB, LEFT: Primary | ICD-10-CM

## 2020-09-16 DIAGNOSIS — M79.672 LEFT FOOT PAIN: ICD-10-CM

## 2020-09-16 DIAGNOSIS — M20.41 HAMMER TOE OF RIGHT FOOT: ICD-10-CM

## 2020-09-16 PROCEDURE — 99203 OFFICE O/P NEW LOW 30 MIN: CPT | Performed by: PODIATRIST

## 2020-09-16 PROCEDURE — 73630 X-RAY EXAM OF FOOT: CPT | Mod: TC

## 2020-09-16 ASSESSMENT — PAIN SCALES - GENERAL: PAINLEVEL: NO PAIN (0)

## 2020-09-16 ASSESSMENT — MIFFLIN-ST. JEOR: SCORE: 1304.27

## 2020-09-16 NOTE — PROGRESS NOTES
"Chief complaint: Patient presents with:  Consult: previous foot surgeries  DR HINOJOSA for the left foot and unknown surgeon for the right foot    and having new concerns        History of Present Illness: This 25 year old female is seen at the request of No ref. provider found for evaluation and suggestions of management of LEFT foot pain and a RIGHT 3rd digit bump on her toe.    Historically, patient had her RIGHT foot Tailor's bunion and RIGHT 3rd digit mallet toe surgically corrected by Dr. Gayle at [facility] around 2013 or 2014. She then had her LEFT foot Tailor's bunion surgically corrected by Dr. Tree Hinojosa at [facility] around 2014 or 2015. She healed uneventfully. She later developed a new bump on the lateral aspect of her RIGHT 3rd digit PIPJ. It does not cause her pain but she is concerned if something happened from the surgery or if this is an area in which she does not need to be concerned.    Her main complaint today is a tingling, electrical sensation on her LEFT lateral 5th ray near the 5th metatarsal head. She has had some intermittent tingling of this area for the past several months, but she only started noticing it consistently the past month. About three weeks ago (end of August / early September, 2020), her LEFT lateral distal 5th ray had consistent tingling / electrical sensations that persisted for a full week. This is the first time she has had this kind of pain last this long. The sensations have greatly improve the past couple of weeks, but she is not sure what has been causing these sensations and what is wrong.    No further pedal complaints today.           /68 (BP Location: Right arm, Cuff Size: Adult Regular)   Pulse 103   Temp 97.4  F (36.3  C) (Tympanic)   Resp 14   Ht 1.702 m (5' 7\")   Wt 52.2 kg (115 lb)   SpO2 98%   BMI 18.01 kg/m      Patient Active Problem List   Diagnosis     Hypovitaminosis D     ACP (advance care planning)     " Multiple atypical nevi     Premenstrual syndrome     Infective otitis externa, right     Acne vulgaris       Past Surgical History:   Procedure Laterality Date     FOOT SURGERY  [id number]    bunions/hammer toes/reconstruction to right foot     SIGMOIDOSCOPY,DIAGNOSTIC  03/16/2007     UPPER GI ENDOSCOPY  03/16/2007       Current Outpatient Medications   Medication     Cholecalciferol (VITAMIN D) 1000 UNITS capsule     clindamycin-benzoyl peroxide (BENZACLIN) 1-5 % external gel     No current facility-administered medications for this visit.         No Known Allergies    Family History   Problem Relation Age of Onset     Asthma Mother      Gastrointestinal Disease Mother         ulcers     Immunodeficiency Mother         fibromyalgia     Allergies Father      Cardiovascular Paternal Grandfather      Liver Disease Paternal Grandfather      Cancer Maternal Grandmother         ovarian?     Lung Cancer Maternal Grandfather         +tobacco     Family History Negative Sister      Family History Negative Paternal Grandmother        Social History     Socioeconomic History     Marital status: Single     Spouse name: None     Number of children: 0     Years of education: 16     Highest education level: None   Occupational History     Occupation:      Employer: OTHER     Comment: [employer]   Social Needs     Financial resource strain: None     Food insecurity     Worry: None     Inability: None     Transportation needs     Medical: None     Non-medical: None   Tobacco Use     Smoking status: Never Smoker     Smokeless tobacco: Never Used   Substance and Sexual Activity     Alcohol use: Yes     Comment: occasional     Drug use: No     Sexual activity: Never   Lifestyle     Physical activity     Days per week: None     Minutes per session: None     Stress: None   Relationships     Social connections     Talks on phone: None     Gets together: None     Attends Zoroastrianism service: None     Active member of  club or organization: None     Attends meetings of clubs or organizations: None     Relationship status: None     Intimate partner violence     Fear of current or ex partner: None     Emotionally abused: None     Physically abused: None     Forced sexual activity: None   Other Topics Concern      Service No     Blood Transfusions Yes     Caffeine Concern No     Occupational Exposure Not Asked     Hobby Hazards Not Asked     Sleep Concern Not Asked     Stress Concern Not Asked     Weight Concern Not Asked     Special Diet Not Asked     Back Care Not Asked     Exercise No     Bike Helmet Not Asked     Seat Belt Yes     Self-Exams Not Asked     Parent/sibling w/ CABG, MI or angioplasty before 65F 55M? Not Asked   Social History Narrative     -- n/a    Caffeine -- occ soda    Exercise -- none       ROS: 10 point ROS neg other than the symptoms noted above in the HPI.  EXAM  Constitutional: healthy, alert and no distress    Psychiatric: mentation appears normal and affect normal/bright    VASCULAR:  -Dorsalis pedis pulse +2/4 b/l  -Posterior tibial pulse +2/4 b/l  -Capillary refill time < 3 seconds to b/l hallux  NEURO:  -Light touch sensation intact to b/l plantar forefoot  DERM:  -Skin temperature, texture and turgor WNL b/l  MSK:  -Pain on palpation to LEFT lateral slightly plantar aspect of the 5th metatarsal head and 5th MTPJ  -Moderately high arch to bilateral feet while patient is NWB  -Muscle strength of ankles +5/5 for dorsiflexion, plantarflexion, ABDUction and ADDuction b/l    RIGHT FOOT RADIOGRAPH 09/16/2020  IMPRESSION: Deformity of the proximal phalanx of the third toe which appears to be secondary to an old injury.    YAN PLUMMER MD    Additionally, this is likely from a failed fused at post-op or an arthroplasty that resulted in a medial subluxation of the middle phalanx on the proximal phalanx.    LEFT FOOT RADIOGRAPH 09/16/2020  FINDINGS:  There are 2 screws in the distal fifth  metatarsal. No acute fractures dislocations or destructive lesions are noted. The articular spaces are normal in height.   IMPRESSION: Postoperative changes distal fifth metatarsal.     YAN PLUMMER MD  ============================================================    ASSESSMENT:  (G57.82) Nerve entrapment of lower limb, left  (primary encounter diagnosis)    (M79.672) Left foot pain    (M79.671) Right foot pain    (M20.41) Hammer toe of right foot    (M20.42) Hammer toe of left foot    (Q66.70) Congenital pes cavus      PLAN:  -Patient evaluated and examined. Treatment options discussed with no educational barriers noted.  -RIGHT toe bump:  ---Radiographs: The bony prominence is the bone of her proximal phalanx. The fusion site from surgery does not appear to have fully healed and the middle phalanx shifted medially. The bump could be removed in the future and the toe could be fused, but she would have shortening of the toe. Patient was called with results by this author on 09/16/2020.  ---Patient may try a silicone toe sleeve if this prominence is causing pain against the adjacent toe.    -LEFT foot pain  ---Radiographs: No abnormalities noted around area of pain. Patient was called with results by this author on 09/16/2020.  ---A number of factors could be causing this pain. The pain seems most consistent with impingement of the sural nerve. This could be from increased edema when the weather was hot and humid in August when her pain flared. It seems unlikely that the nerve is irritated from scar tissue since the cicatrix is more dorsal than the pain / sensations and this surgery was 5+ years ago.   ---Her pain is consistent with likely nerve pain (tingling, electrical sensations, etc.).  ---Silicone bunionette sleeve can be used to keep pressure off the area of pain  ---An injection may help calm the irritated nerve in this area -- patient has declined at this time since the pain has recently  improved.  ---CMOs with a metatarsal bar offload the 5th metatarsal head may help decrease this pain. She has half length CMOs and she would like them resurfaced with an additional potential full length CMO with a metatarsal bar.  -Orthotic referral for CMO with metatarsal bar and/or re-surface half length insert CMOs    -Patient in agreement with the above treatment plan and all of patient's questions were answered.      RTC as needed        Hannah Griffin DPM

## 2020-09-16 NOTE — NURSING NOTE
"Chief Complaint   Patient presents with     Consult     previous foot surgeries  DR HINOJOSA for the left foot and unknown surgeon for the right foot    and having new concerns       Initial /68 (BP Location: Right arm, Cuff Size: Adult Regular)   Pulse 103   Temp 97.4  F (36.3  C) (Tympanic)   Resp 14   Ht 1.702 m (5' 7\")   Wt 52.2 kg (115 lb)   SpO2 98%   BMI 18.01 kg/m   Estimated body mass index is 18.01 kg/m  as calculated from the following:    Height as of this encounter: 1.702 m (5' 7\").    Weight as of this encounter: 52.2 kg (115 lb).  Medication Reconciliation: complete  Vanesa Peterson LPN    "

## 2020-10-15 ENCOUNTER — TELEPHONE (OUTPATIENT)
Dept: FAMILY MEDICINE | Facility: OTHER | Age: 25
End: 2020-10-15

## 2020-10-15 ENCOUNTER — OFFICE VISIT (OUTPATIENT)
Dept: FAMILY MEDICINE | Facility: OTHER | Age: 25
End: 2020-10-15
Attending: FAMILY MEDICINE
Payer: COMMERCIAL

## 2020-10-15 VITALS
TEMPERATURE: 97.7 F | DIASTOLIC BLOOD PRESSURE: 62 MMHG | WEIGHT: 111.8 LBS | HEART RATE: 112 BPM | RESPIRATION RATE: 20 BRPM | OXYGEN SATURATION: 99 % | SYSTOLIC BLOOD PRESSURE: 108 MMHG | BODY MASS INDEX: 17.51 KG/M2

## 2020-10-15 DIAGNOSIS — N89.8 VAGINAL IRRITATION: ICD-10-CM

## 2020-10-15 DIAGNOSIS — Z12.4 PAP SMEAR FOR CERVICAL CANCER SCREENING: ICD-10-CM

## 2020-10-15 DIAGNOSIS — N75.1 BARTHOLIN'S GLAND ABSCESS: Primary | ICD-10-CM

## 2020-10-15 LAB
SPECIMEN SOURCE: NORMAL
WET PREP SPEC: NORMAL

## 2020-10-15 PROCEDURE — 99214 OFFICE O/P EST MOD 30 MIN: CPT | Performed by: FAMILY MEDICINE

## 2020-10-15 PROCEDURE — G0123 SCREEN CERV/VAG THIN LAYER: HCPCS | Performed by: FAMILY MEDICINE

## 2020-10-15 PROCEDURE — 87210 SMEAR WET MOUNT SALINE/INK: CPT | Performed by: FAMILY MEDICINE

## 2020-10-15 ASSESSMENT — PAIN SCALES - GENERAL: PAINLEVEL: MODERATE PAIN (5)

## 2020-10-15 NOTE — NURSING NOTE
"Chief Complaint   Patient presents with     Vaginal Problem       Initial /62 (BP Location: Right arm, Patient Position: Chair, Cuff Size: Adult Regular)   Pulse 112   Temp 97.7  F (36.5  C) (Tympanic)   Resp 20   Wt 50.7 kg (111 lb 12.8 oz)   LMP 10/06/2020   SpO2 99%   BMI 17.51 kg/m   Estimated body mass index is 17.51 kg/m  as calculated from the following:    Height as of 9/16/20: 1.702 m (5' 7\").    Weight as of this encounter: 50.7 kg (111 lb 12.8 oz).  Medication Reconciliation: complete  Lacy Iraheta LPN    "

## 2020-10-15 NOTE — PROGRESS NOTES
Subjective     Evangelina Otero is a 25 year old female who presents to clinic today for the following health issues:    HPI         Vaginal Symptoms  Onset/Duration: 2 days   Description:  Vaginal Discharge: none   Itching (Pruritis): YES  Burning sensation:  no  Odor: no  Accompanying Signs & Symptoms:  Urinary symptoms: no  Abdominal pain: no  Fever: no  History:   Sexually active: YES  New Partner: no  Possibility of Pregnancy:  no  Recent antibiotic use: no  Previous vaginitis issues: YES- hx of cysts   Precipitating or alleviating factors: None  Therapies tried and outcome: topical antibacterial cream to labia         Review of Systems   Constitutional, HEENT, cardiovascular, pulmonary, gi and gu systems are negative, except as otherwise noted.      Objective    /62 (BP Location: Right arm, Patient Position: Chair, Cuff Size: Adult Regular)   Pulse 112   Temp 97.7  F (36.5  C) (Tympanic)   Resp 20   Wt 50.7 kg (111 lb 12.8 oz)   LMP 10/06/2020   SpO2 99%   BMI 17.51 kg/m    Body mass index is 17.51 kg/m .  Physical Exam   GENERAL: healthy, alert and no distress  RESP: lungs clear to auscultation - no rales, rhonchi or wheezes  CV: regular rate and rhythm, normal S1 S2, no S3 or S4, no murmur, click or rub, no peripheral edema and peripheral pulses strong  ABDOMEN: soft, nontender, no hepatosplenomegaly, no masses and bowel sounds normal   (female): normal female external genitalia except left labia erythematous, enlarged and approx 2 cm deep bartholins gland cyst or abscess, tender to palpation, normal urethral meatus , vaginal mucosa pink, moist, well rugated, vaginal discharge - scant and erythematous cervix, normal adnexae, and uterus without masses.  MS: no gross musculoskeletal defects noted, no edema  PSYCH: mentation appears normal, affect normal/bright    No results found for any visits on 10/15/20.        Assessment & Plan     Bartholin's gland abscess  Discussed with patient and her mother  options to consider, ob is able to get her in tomorrow morning  Discussed sitz baths, tub soaks, hot washcloths  Reassurance given  - OB/GYN REFERRAL    Vaginal irritation    - Wet prep    Pap smear for cervical cancer screening    - A pap thin layer screen only - recommended age 21 - 24 years      Patient was agreeable to this plan and had no further questions.  There are no Patient Instructions on file for this visit.    No follow-ups on file.    Meena Wright MD  [facility] [place]

## 2020-10-15 NOTE — TELEPHONE ENCOUNTER
To: Nurse to Dr. Wright  9:10 AM    Reason for Call: OVERBOOK    Patient is having the following symptoms: vaginal irritation    The patient is requesting an appointment for today 10/15/20 with Dr. Wright.    Was an appointment offered for this call? No  If yes : Appointment type              Date    Preferred method for responding to this message: Telephone Call     What is your phone number [phone]    If we cannot reach you directly, may we leave a detailed response at the number you provided? Yes    Can this message wait until your PCP/provider returns, if unavailable today? Not applicable, provider is in    [facility]

## 2020-10-15 NOTE — TELEPHONE ENCOUNTER
Please schedule patient for date/time:  Today at 2:00 arrive 145.     Have patient go to ER/Urgent Care Center. Urgent Care hours are 9:30 am to 8 pm, open 7 days a week. No.    Provider will call patient.No.    Other:

## 2020-10-16 ENCOUNTER — OFFICE VISIT (OUTPATIENT)
Dept: OBGYN | Facility: OTHER | Age: 25
End: 2020-10-16
Attending: OBSTETRICS & GYNECOLOGY
Payer: COMMERCIAL

## 2020-10-16 VITALS
DIASTOLIC BLOOD PRESSURE: 60 MMHG | HEIGHT: 67 IN | WEIGHT: 111 LBS | BODY MASS INDEX: 17.42 KG/M2 | SYSTOLIC BLOOD PRESSURE: 102 MMHG | HEART RATE: 88 BPM

## 2020-10-16 DIAGNOSIS — N76.4 LEFT GENITAL LABIAL ABSCESS: Primary | ICD-10-CM

## 2020-10-16 PROCEDURE — 99202 OFFICE O/P NEW SF 15 MIN: CPT | Performed by: OBSTETRICS & GYNECOLOGY

## 2020-10-16 RX ORDER — DOXYCYCLINE HYCLATE 100 MG
100 TABLET ORAL 2 TIMES DAILY WITH MEALS
Qty: 20 TABLET | Refills: 1 | Status: SHIPPED | OUTPATIENT
Start: 2020-10-16 | End: 2020-10-26

## 2020-10-16 ASSESSMENT — PAIN SCALES - GENERAL: PAINLEVEL: MILD PAIN (2)

## 2020-10-16 ASSESSMENT — MIFFLIN-ST. JEOR: SCORE: 1286.12

## 2020-10-16 NOTE — PROGRESS NOTES
CONSULT FOR Meena Wright MD      FOR:   Possible LEFT Bartholin Abcess      S:   Patient noticed pain and swelling of left vulvar region 2 nights ago, saw her primary care provider and referred here.   She has had something similar and just rx;d that symptomatically.  Denies fever, chills, unusual discharge.  Menses are normal.      O:   LEFT Labial subcutaneous swelling with minimal fluctuance.  1.5cm  Left and Right Bartholin glands are normal      A:   LEFT Labial Subcutaneous Abcess 1.5cm      P:   Heat        Dial soap---dye free, scent free        No douching or vaginal products.        Doxycycline 100mg BID        RF of Doxy done in case of recurrence.         Surgical intervention not indicated at this time.

## 2020-10-16 NOTE — PATIENT INSTRUCTIONS
Dial dye free, scent free soap.  No douching, vaginal products.    Heat packs  Discussed Doxycycline therapy

## 2020-10-16 NOTE — NURSING NOTE
"Chief Complaint   Patient presents with     Consult     Consult from Dr Wright for bartholins cyst       Initial /60 (BP Location: Left arm, Patient Position: Sitting, Cuff Size: Adult Regular)   Pulse 88   Ht 1.702 m (5' 7\")   Wt 50.3 kg (111 lb)   LMP 10/06/2020   BMI 17.39 kg/m   Estimated body mass index is 17.39 kg/m  as calculated from the following:    Height as of this encounter: 1.702 m (5' 7\").    Weight as of this encounter: 50.3 kg (111 lb).  Medication Reconciliation: complete  ADARSH SOTOMAYOR, DIAMOND  "

## 2020-10-19 LAB
COPATH REPORT: NORMAL
PAP: NORMAL

## 2020-10-20 NOTE — PATIENT INSTRUCTIONS
FEMM teo  Methyl- folate vitamin  If you do get pregnant, do e-visit with me and then we will check progesterone    Preventive Health Recommendations  Female Ages 21 to 25     Yearly exam:     See your health care provider every year in order to  o Review health changes.   o Discuss preventive care.    o Review your medicines if your doctor has prescribed any.      You should be tested each year for STDs (sexually transmitted diseases).       Talk to your provider about how often you should have cholesterol testing.      Get a Pap test every three years. If you have an abnormal result, your doctor may have you test more often.      If you are at risk for diabetes, you should have a diabetes test (fasting glucose).     Shots:     Get a flu shot each year.     Get a tetanus shot every 10 years.     Consider getting the shot (vaccine) that prevents cervical cancer (Gardasil).    Nutrition:     Eat at least 5 servings of fruits and vegetables each day.    Eat whole-grain bread, whole-wheat pasta and brown rice instead of white grains and rice.    Get adequate Calcium and Vitamin D.     Lifestyle    Exercise at least 150 minutes a week each week (30 minutes a day, 5 days a week). This will help you control your weight and prevent disease.    Limit alcohol to one drink per day.    No smoking.     Wear sunscreen to prevent skin cancer.    See your dentist every six months for an exam and cleaning.

## 2020-10-20 NOTE — PROGRESS NOTES
SUBJECTIVE:   CC: Evangelina Otero is an 25 year old woman who presents for preventive health visit.       Healthy Habits:    Do you get at least three servings of calcium containing foods daily (dairy, green leafy vegetables, etc.)? no, taking calcium and/or vitamin D supplement: yes -     Amount of exercise or daily activities, outside of work: 01 day(s) per week    Problems taking medications regularly No    Medication side effects: No    Have you had an eye exam in the past two years? no    Do you see a dentist twice per year? yes    Do you have sleep apnea, excessive snoring or daytime drowsiness?no          Today's PHQ-2 Score:   PHQ-2 ( 1999 Pfizer) 9/16/2020 5/23/2019   Q1: Little interest or pleasure in doing things 0 0   Q2: Feeling down, depressed or hopeless 0 0   PHQ-2 Score 0 0       Abuse: Current or Past(Physical, Sexual or Emotional)- No  Do you feel safe in your environment? Yes        Social History     Tobacco Use     Smoking status: Never Smoker     Smokeless tobacco: Never Used   Substance Use Topics     Alcohol use: Yes     Comment: occasional     If you drink alcohol do you typically have >3 drinks per day or >7 drinks per week? No                     Reviewed orders with patient.  Reviewed health maintenance and updated orders accordingly - Yes  Lab work is in process  Labs reviewed in EPIC  BP Readings from Last 3 Encounters:   10/26/20 120/60   10/16/20 102/60   10/15/20 108/62    Wt Readings from Last 3 Encounters:   10/26/20 54.3 kg (119 lb 9.6 oz)   10/16/20 50.3 kg (111 lb)   10/15/20 50.7 kg (111 lb 12.8 oz)                  Patient Active Problem List   Diagnosis     Hypovitaminosis D     ACP (advance care planning)     Multiple atypical nevi     Premenstrual syndrome     Infective otitis externa, right     Acne vulgaris     Past Surgical History:   Procedure Laterality Date     FOOT SURGERY  [id number]    bunions/hammer toes/reconstruction to right foot     SIGMOIDOSCOPY,DIAGNOSTIC   03/16/2007     UPPER GI ENDOSCOPY  03/16/2007       Social History     Tobacco Use     Smoking status: Never Smoker     Smokeless tobacco: Never Used   Substance Use Topics     Alcohol use: Yes     Frequency: Monthly or less     Drinks per session: 1 or 2     Comment: occasional     Family History   Problem Relation Age of Onset     Asthma Mother      Gastrointestinal Disease Mother         ulcers     Immunodeficiency Mother         fibromyalgia     Allergies Father      Cardiovascular Paternal Grandfather      Liver Disease Paternal Grandfather      Cancer Maternal Grandmother         ovarian?     Diabetes Maternal Grandmother      Heart Failure Maternal Grandmother      Lung Cancer Maternal Grandfather         +tobacco     Autoimmune Disease Sister         ?  TRINITY positive     Unknown/Adopted Paternal Grandmother          Current Outpatient Medications   Medication Sig Dispense Refill     Calcium Carb-Cholecalciferol (CALCIUM 500 +D PO)        Cholecalciferol (VITAMIN D) 125 MCG (5000 UT) CAPS Take 1 capsule by mouth daily       clindamycin-benzoyl peroxide (BENZACLIN) 1-5 % external gel Apply topically 2 times daily 50 g 3     multivitamin w/minerals (THERA-VIT-M) tablet Take 1 tablet by mouth daily       No Known Allergies    Mammogram not appropriate for this patient based on age.    Pertinent mammograms are reviewed under the imaging tab.  History of abnormal Pap smear: NO - age 21-29 PAP every 3 years recommended  PAP / HPV 10/15/2020   PAP NIL     Reviewed and updated as needed this visit by clinical staff  Tobacco  Allergies  Meds   Med Hx  Surg Hx  Fam Hx  Soc Hx        Reviewed and updated as needed this visit by Provider                Past Medical History:   Diagnosis Date     Chronic abdominal pain     Probable IBS     Chronic urticaria      Contact dermatitis and other eczema, due to unspecified cause      Hypermetropia     minimum hyperopia. Does not need glasses     Unspecified constipation   "    Vomiting alone     Recurrent abdominal problems. Milk free diet. Tigan suppositories given.      Past Surgical History:   Procedure Laterality Date     FOOT SURGERY  [id number]    bunions/hammer toes/reconstruction to right foot     SIGMOIDOSCOPY,DIAGNOSTIC  2007     UPPER GI ENDOSCOPY  2007     OB History    Para Term  AB Living   0 0 0 0 0 0   SAB TAB Ectopic Multiple Live Births   0 0 0 0 0       ROS:  CONSTITUTIONAL: NEGATIVE for fever, chills, change in weight  INTEGUMENTARU/SKIN: NEGATIVE for worrisome rashes, moles or lesions  EYES: NEGATIVE for vision changes or irritation  ENT: NEGATIVE for ear, mouth and throat problems  RESP: NEGATIVE for significant cough or SOB  BREAST: NEGATIVE for masses, tenderness or discharge  CV: NEGATIVE for chest pain, palpitations or peripheral edema  GI: NEGATIVE for nausea, abdominal pain, heartburn, or change in bowel habits  : NEGATIVE for unusual urinary or vaginal symptoms. Periods are regular.  MUSCULOSKELETAL: NEGATIVE for significant arthralgias or myalgia  NEURO: NEGATIVE for weakness, dizziness or paresthesias  PSYCHIATRIC: NEGATIVE for changes in mood or affect    OBJECTIVE:   /60 (BP Location: Left arm, Patient Position: Chair, Cuff Size: Adult Regular)   Pulse 88   Temp 98.5  F (36.9  C) (Tympanic)   Resp 20   Ht 1.74 m (5' 8.5\")   Wt 54.3 kg (119 lb 9.6 oz)   LMP 10/06/2020   SpO2 99%   BMI 17.92 kg/m    EXAM:  GENERAL: healthy, alert and no distress  EYES: Eyes grossly normal to inspection, PERRL and conjunctivae and sclerae normal  HENT: ear canals and TM's normal, nose and mouth without ulcers or lesions  NECK: no adenopathy, no asymmetry, masses, or scars and thyroid normal to palpation  RESP: lungs clear to auscultation - no rales, rhonchi or wheezes  BREAST: normal without masses, tenderness or nipple discharge and no palpable axillary masses or adenopathy  CV: regular rate and rhythm, normal S1 S2, no S3 or S4, " no murmur, click or rub, no peripheral edema and peripheral pulses strong  ABDOMEN: soft, nontender, no hepatosplenomegaly, no masses and bowel sounds normal   (female): deferred -- done 2 wks ago  MS: no gross musculoskeletal defects noted, no edema  SKIN: no suspicious lesions or rashes  NEURO: Normal strength and tone, mentation intact and speech normal  PSYCH: mentation appears normal, affect normal/bright    Diagnostic Test Results:  Labs reviewed in Epic  Results for orders placed or performed in visit on 10/26/20   Lipid Profile (Chol, Trig, HDL, LDL calc)     Status: None   Result Value Ref Range    Cholesterol 128 <200 mg/dL    Triglycerides 118 <150 mg/dL    HDL Cholesterol 61 >49 mg/dL    LDL Cholesterol Calculated 43 <100 mg/dL    Non HDL Cholesterol 67 <130 mg/dL   Comprehensive metabolic panel     Status: Abnormal   Result Value Ref Range    Sodium 139 133 - 144 mmol/L    Potassium 3.8 3.4 - 5.3 mmol/L    Chloride 109 94 - 109 mmol/L    Carbon Dioxide 28 20 - 32 mmol/L    Anion Gap 2 (L) 3 - 14 mmol/L    Glucose 83 70 - 99 mg/dL    Urea Nitrogen 14 7 - 30 mg/dL    Creatinine 0.63 0.52 - 1.04 mg/dL    GFR Estimate >90 >60 mL/min/[1.73_m2]    GFR Estimate If Black >90 >60 mL/min/[1.73_m2]    Calcium 9.1 8.5 - 10.1 mg/dL    Bilirubin Total 0.4 0.2 - 1.3 mg/dL    Albumin 3.8 3.4 - 5.0 g/dL    Protein Total 7.7 6.8 - 8.8 g/dL    Alkaline Phosphatase 83 40 - 150 U/L    ALT 21 0 - 50 U/L    AST 15 0 - 45 U/L       ASSESSMENT/PLAN:   (Z00.00) Routine general medical examination at a health care facility  (primary encounter diagnosis)  Comment:   Plan: Lipid Profile (Chol, Trig, HDL, LDL calc), EA         ADD'L VACCINE, TDAP VACCINE (Adacel, Boostrix)         [[id number]]        Follow-up 1 year    (E55.9) Hypovitaminosis D  Comment:   Plan: Vitamin D Deficiency, Comprehensive metabolic         panel          (Z23) Need for influenza vaccination  Comment:   Plan: INFLUENZA VACCINE IM > 6 MONTHS VALENT IIV4        "  [09015], ADMIN 1st VACCINE          (N64.4) Breast tenderness  Comment:   Plan: Multiple Vitamins-Minerals (OPTIVITE P.M.T.)         TABS        Switch from smartypants to optivite  Discussed FABM, apps to use and evisit if pregnant        Patient has been advised of split billing requirements and indicates understanding: Yes  COUNSELING:   Reviewed preventive health counseling, as reflected in patient instructions  Special attention given to:        Regular exercise       Healthy diet/nutrition       Vision screening       Hearing screening    Estimated body mass index is 17.92 kg/m  as calculated from the following:    Height as of this encounter: 1.74 m (5' 8.5\").    Weight as of this encounter: 54.3 kg (119 lb 9.6 oz).        She reports that she has never smoked. She has never used smokeless tobacco.      Counseling Resources:  ATP IV Guidelines  Pooled Cohorts Equation Calculator  Breast Cancer Risk Calculator  BRCA-Related Cancer Risk Assessment: FHS-7 Tool  FRAX Risk Assessment  ICSI Preventive Guidelines  Dietary Guidelines for Americans, 2010  USDA's MyPlate  ASA Prophylaxis  Lung CA Screening    Meena Wright MD  [facility] [place]  "

## 2020-10-26 ENCOUNTER — OFFICE VISIT (OUTPATIENT)
Dept: FAMILY MEDICINE | Facility: OTHER | Age: 25
End: 2020-10-26
Attending: FAMILY MEDICINE
Payer: COMMERCIAL

## 2020-10-26 VITALS
WEIGHT: 119.6 LBS | OXYGEN SATURATION: 99 % | TEMPERATURE: 98.5 F | BODY MASS INDEX: 17.71 KG/M2 | HEIGHT: 69 IN | HEART RATE: 88 BPM | RESPIRATION RATE: 20 BRPM | SYSTOLIC BLOOD PRESSURE: 120 MMHG | DIASTOLIC BLOOD PRESSURE: 60 MMHG

## 2020-10-26 DIAGNOSIS — N64.4 BREAST TENDERNESS: ICD-10-CM

## 2020-10-26 DIAGNOSIS — Z00.00 ROUTINE GENERAL MEDICAL EXAMINATION AT A HEALTH CARE FACILITY: ICD-10-CM

## 2020-10-26 DIAGNOSIS — E55.9 HYPOVITAMINOSIS D: ICD-10-CM

## 2020-10-26 DIAGNOSIS — Z23 NEED FOR INFLUENZA VACCINATION: ICD-10-CM

## 2020-10-26 DIAGNOSIS — Z00.00 ROUTINE GENERAL MEDICAL EXAMINATION AT A HEALTH CARE FACILITY: Primary | ICD-10-CM

## 2020-10-26 LAB
ALBUMIN SERPL-MCNC: 3.8 G/DL (ref 3.4–5)
ALP SERPL-CCNC: 83 U/L (ref 40–150)
ALT SERPL W P-5'-P-CCNC: 21 U/L (ref 0–50)
ANION GAP SERPL CALCULATED.3IONS-SCNC: 2 MMOL/L (ref 3–14)
AST SERPL W P-5'-P-CCNC: 15 U/L (ref 0–45)
BILIRUB SERPL-MCNC: 0.4 MG/DL (ref 0.2–1.3)
BUN SERPL-MCNC: 14 MG/DL (ref 7–30)
CALCIUM SERPL-MCNC: 9.1 MG/DL (ref 8.5–10.1)
CHLORIDE SERPL-SCNC: 109 MMOL/L (ref 94–109)
CHOLEST SERPL-MCNC: 128 MG/DL
CO2 SERPL-SCNC: 28 MMOL/L (ref 20–32)
CREAT SERPL-MCNC: 0.63 MG/DL (ref 0.52–1.04)
GFR SERPL CREATININE-BSD FRML MDRD: >90 ML/MIN/{1.73_M2}
GLUCOSE SERPL-MCNC: 83 MG/DL (ref 70–99)
HDLC SERPL-MCNC: 61 MG/DL
LDLC SERPL CALC-MCNC: 43 MG/DL
NONHDLC SERPL-MCNC: 67 MG/DL
POTASSIUM SERPL-SCNC: 3.8 MMOL/L (ref 3.4–5.3)
PROT SERPL-MCNC: 7.7 G/DL (ref 6.8–8.8)
SODIUM SERPL-SCNC: 139 MMOL/L (ref 133–144)
TRIGL SERPL-MCNC: 118 MG/DL

## 2020-10-26 PROCEDURE — 90686 IIV4 VACC NO PRSV 0.5 ML IM: CPT | Performed by: FAMILY MEDICINE

## 2020-10-26 PROCEDURE — 36415 COLL VENOUS BLD VENIPUNCTURE: CPT | Performed by: FAMILY MEDICINE

## 2020-10-26 PROCEDURE — 80053 COMPREHEN METABOLIC PANEL: CPT | Performed by: FAMILY MEDICINE

## 2020-10-26 PROCEDURE — 90472 IMMUNIZATION ADMIN EACH ADD: CPT | Performed by: FAMILY MEDICINE

## 2020-10-26 PROCEDURE — 90715 TDAP VACCINE 7 YRS/> IM: CPT | Performed by: FAMILY MEDICINE

## 2020-10-26 PROCEDURE — 82306 VITAMIN D 25 HYDROXY: CPT | Performed by: FAMILY MEDICINE

## 2020-10-26 PROCEDURE — 90471 IMMUNIZATION ADMIN: CPT | Performed by: FAMILY MEDICINE

## 2020-10-26 PROCEDURE — 80061 LIPID PANEL: CPT | Performed by: FAMILY MEDICINE

## 2020-10-26 PROCEDURE — 99395 PREV VISIT EST AGE 18-39: CPT | Mod: 25 | Performed by: FAMILY MEDICINE

## 2020-10-26 RX ORDER — MULTIVITAMIN WITH MINERALS
3 TABLET ORAL 2 TIMES DAILY
Qty: 180 TABLET | Refills: 3 | Status: SHIPPED | OUTPATIENT
Start: 2020-10-26

## 2020-10-26 RX ORDER — CHOLECALCIFEROL (VITAMIN D3) 125 MCG
1 CAPSULE ORAL DAILY
COMMUNITY

## 2020-10-26 RX ORDER — MULTIPLE VITAMINS W/ MINERALS TAB 9MG-400MCG
1 TAB ORAL DAILY
COMMUNITY
End: 2022-08-10

## 2020-10-26 SDOH — SOCIAL STABILITY: SOCIAL NETWORK: ARE YOU MARRIED, WIDOWED, DIVORCED, SEPARATED, NEVER MARRIED, OR LIVING WITH A PARTNER?: NOT ASKED

## 2020-10-26 SDOH — SOCIAL STABILITY: SOCIAL NETWORK: HOW OFTEN DO YOU GET TOGETHER WITH FRIENDS OR RELATIVES?: NOT ASKED

## 2020-10-26 SDOH — SOCIAL STABILITY: SOCIAL NETWORK: IN A TYPICAL WEEK, HOW MANY TIMES DO YOU TALK ON THE PHONE WITH FAMILY, FRIENDS, OR NEIGHBORS?: NOT ASKED

## 2020-10-26 SDOH — SOCIAL STABILITY: SOCIAL INSECURITY
WITHIN THE LAST YEAR, HAVE TO BEEN RAPED OR FORCED TO HAVE ANY KIND OF SEXUAL ACTIVITY BY YOUR PARTNER OR EX-PARTNER?: NO

## 2020-10-26 SDOH — SOCIAL STABILITY: SOCIAL INSECURITY
WITHIN THE LAST YEAR, HAVE YOU BEEN KICKED, HIT, SLAPPED, OR OTHERWISE PHYSICALLY HURT BY YOUR PARTNER OR EX-PARTNER?: NO

## 2020-10-26 SDOH — ECONOMIC STABILITY: TRANSPORTATION INSECURITY
IN THE PAST 12 MONTHS, HAS LACK OF TRANSPORTATION KEPT YOU FROM MEETINGS, WORK, OR FROM GETTING THINGS NEEDED FOR DAILY LIVING?: NOT ASKED

## 2020-10-26 SDOH — SOCIAL STABILITY: SOCIAL NETWORK: HOW OFTEN DO YOU ATTENT MEETINGS OF THE CLUB OR ORGANIZATION YOU BELONG TO?: NOT ASKED

## 2020-10-26 SDOH — HEALTH STABILITY: MENTAL HEALTH: HOW OFTEN DO YOU HAVE 6 OR MORE DRINKS ON ONE OCCASION?: NOT ASKED

## 2020-10-26 SDOH — ECONOMIC STABILITY: INCOME INSECURITY: HOW HARD IS IT FOR YOU TO PAY FOR THE VERY BASICS LIKE FOOD, HOUSING, MEDICAL CARE, AND HEATING?: NOT ASKED

## 2020-10-26 SDOH — ECONOMIC STABILITY: FOOD INSECURITY: WITHIN THE PAST 12 MONTHS, THE FOOD YOU BOUGHT JUST DIDN'T LAST AND YOU DIDN'T HAVE MONEY TO GET MORE.: NOT ASKED

## 2020-10-26 SDOH — HEALTH STABILITY: PHYSICAL HEALTH: ON AVERAGE, HOW MANY DAYS PER WEEK DO YOU ENGAGE IN MODERATE TO STRENUOUS EXERCISE (LIKE A BRISK WALK)?: 0 DAYS

## 2020-10-26 SDOH — HEALTH STABILITY: PHYSICAL HEALTH: ON AVERAGE, HOW MANY MINUTES DO YOU ENGAGE IN EXERCISE AT THIS LEVEL?: 0 MIN

## 2020-10-26 SDOH — HEALTH STABILITY: MENTAL HEALTH
STRESS IS WHEN SOMEONE FEELS TENSE, NERVOUS, ANXIOUS, OR CAN'T SLEEP AT NIGHT BECAUSE THEIR MIND IS TROUBLED. HOW STRESSED ARE YOU?: NOT ASKED

## 2020-10-26 SDOH — SOCIAL STABILITY: SOCIAL NETWORK
DO YOU BELONG TO ANY CLUBS OR ORGANIZATIONS SUCH AS CHURCH GROUPS UNIONS, FRATERNAL OR ATHLETIC GROUPS, OR SCHOOL GROUPS?: NOT ASKED

## 2020-10-26 SDOH — HEALTH STABILITY: MENTAL HEALTH: HOW MANY STANDARD DRINKS CONTAINING ALCOHOL DO YOU HAVE ON A TYPICAL DAY?: 1 OR 2

## 2020-10-26 SDOH — ECONOMIC STABILITY: FOOD INSECURITY: WITHIN THE PAST 12 MONTHS, YOU WORRIED THAT YOUR FOOD WOULD RUN OUT BEFORE YOU GOT MONEY TO BUY MORE.: NOT ASKED

## 2020-10-26 SDOH — SOCIAL STABILITY: SOCIAL NETWORK: HOW OFTEN DO YOU ATTEND CHURCH OR RELIGIOUS SERVICES?: NOT ASKED

## 2020-10-26 SDOH — ECONOMIC STABILITY: TRANSPORTATION INSECURITY
IN THE PAST 12 MONTHS, HAS THE LACK OF TRANSPORTATION KEPT YOU FROM MEDICAL APPOINTMENTS OR FROM GETTING MEDICATIONS?: NOT ASKED

## 2020-10-26 SDOH — SOCIAL STABILITY: SOCIAL INSECURITY: WITHIN THE LAST YEAR, HAVE YOU BEEN AFRAID OF YOUR PARTNER OR EX-PARTNER?: NO

## 2020-10-26 SDOH — HEALTH STABILITY: MENTAL HEALTH: HOW OFTEN DO YOU HAVE A DRINK CONTAINING ALCOHOL?: MONTHLY OR LESS

## 2020-10-26 SDOH — SOCIAL STABILITY: SOCIAL INSECURITY: WITHIN THE LAST YEAR, HAVE YOU BEEN HUMILIATED OR EMOTIONALLY ABUSED IN OTHER WAYS BY YOUR PARTNER OR EX-PARTNER?: NO

## 2020-10-26 ASSESSMENT — PAIN SCALES - GENERAL: PAINLEVEL: NO PAIN (0)

## 2020-10-26 ASSESSMENT — ANXIETY QUESTIONNAIRES
GAD7 TOTAL SCORE: 1
2. NOT BEING ABLE TO STOP OR CONTROL WORRYING: NOT AT ALL
3. WORRYING TOO MUCH ABOUT DIFFERENT THINGS: NOT AT ALL
1. FEELING NERVOUS, ANXIOUS, OR ON EDGE: SEVERAL DAYS
6. BECOMING EASILY ANNOYED OR IRRITABLE: NOT AT ALL
5. BEING SO RESTLESS THAT IT IS HARD TO SIT STILL: NOT AT ALL
7. FEELING AFRAID AS IF SOMETHING AWFUL MIGHT HAPPEN: NOT AT ALL

## 2020-10-26 ASSESSMENT — PATIENT HEALTH QUESTIONNAIRE - PHQ9
5. POOR APPETITE OR OVEREATING: NOT AT ALL
SUM OF ALL RESPONSES TO PHQ QUESTIONS 1-9: 1

## 2020-10-26 ASSESSMENT — MIFFLIN-ST. JEOR: SCORE: 1348.94

## 2020-10-26 NOTE — NURSING NOTE
"Chief Complaint   Patient presents with     Physical       Initial /60 (BP Location: Left arm, Patient Position: Chair, Cuff Size: Adult Regular)   Pulse 88   Temp 98.5  F (36.9  C) (Tympanic)   Resp 20   Ht 1.74 m (5' 8.5\")   Wt 54.3 kg (119 lb 9.6 oz)   LMP 10/06/2020   SpO2 99%   BMI 17.92 kg/m   Estimated body mass index is 17.92 kg/m  as calculated from the following:    Height as of this encounter: 1.74 m (5' 8.5\").    Weight as of this encounter: 54.3 kg (119 lb 9.6 oz).  Medication Reconciliation: complete  Lacy Iraheta LPN    "

## 2020-10-27 LAB — DEPRECATED CALCIDIOL+CALCIFEROL SERPL-MC: 27 UG/L (ref 20–75)

## 2020-10-27 ASSESSMENT — ANXIETY QUESTIONNAIRES: GAD7 TOTAL SCORE: 1

## 2021-11-02 ENCOUNTER — MYC MEDICAL ADVICE (OUTPATIENT)
Dept: FAMILY MEDICINE | Facility: OTHER | Age: 26
End: 2021-11-02

## 2021-11-03 NOTE — TELEPHONE ENCOUNTER
Ok, well she could probably get one done at [facility] or [facility] or something like that.  Otherwise we would need to schedule her at a [facility]

## 2021-11-28 ENCOUNTER — HEALTH MAINTENANCE LETTER (OUTPATIENT)
Age: 26
End: 2021-11-28

## 2022-03-09 ENCOUNTER — NURSE TRIAGE (OUTPATIENT)
Dept: FAMILY MEDICINE | Facility: OTHER | Age: 27
End: 2022-03-09
Payer: COMMERCIAL

## 2022-03-09 DIAGNOSIS — R07.0 THROAT PAIN: Primary | ICD-10-CM

## 2022-03-09 NOTE — TELEPHONE ENCOUNTER
"Sore throat, pain increases with talking and swallowing. Bilateral ears - mild pain.    Denies fever or rash.     No openings in clinic, patient inquiring on coming in for a strep test only.     Please advise.     Patient can be reached at [phone]      Reason for Disposition    SEVERE sore throat pain    Additional Information    Negative: Severe difficulty breathing (e.g., struggling for each breath, speaks in single words)    Negative: Sounds like a life-threatening emergency to the triager    Negative: Throat culture results, call about    Negative: Productive cough is the main symptom    Negative: Runny nose is the main symptom    Negative: Drooling or spitting out saliva (because can't swallow)    Negative: Unable to open mouth completely    Negative: Drinking very little and has signs of dehydration (e.g., no urine > 12 hours, very dry mouth, very lightheaded)    Negative: Patient sounds very sick or weak to the triager    Negative: Difficulty breathing (per caller) but not severe    Negative: Fever > 103 F (39.4 C)    Negative: Refuses to drink anything for > 12 hours    Answer Assessment - Initial Assessment Questions  1. ONSET: \"When did the throat start hurting?\" (Hours or days ago)       3/8/22    2. SEVERITY: \"How bad is the sore throat?\" (Scale 1-10; mild, moderate or severe)    - MILD (1-3):  doesn't interfere with eating or normal activities    - MODERATE (4-7): interferes with eating some solids and normal activities    - SEVERE (8-10):  excruciating pain, interferes with most normal activities    - SEVERE DYSPHAGIA: can't swallow liquids, drooling      Severe    3. STREP EXPOSURE: \"Has there been any exposure to strep within the past week?\" If so, ask: \"What type of contact occurred?\"       No     4.  VIRAL SYMPTOMS: \"Are there any symptoms of a cold, such as a runny nose, cough, hoarse voice or red eyes?\"       Bilateral ears - mild pain     5. FEVER: \"Do you have a fever?\" If so, ask: \"What " "is your temperature, how was it measured, and when did it start?\"      No     6. PUS ON THE TONSILS: \"Is there pus on the tonsils in the back of your throat?\"      No    7. OTHER SYMPTOMS: \"Do you have any other symptoms?\" (e.g., difficulty breathing, headache, rash)      No    8. PREGNANCY: \"Is there any chance you are pregnant?\" \"When was your last menstrual period?\"      No    Protocols used: SORE THROAT-A-OH      "

## 2022-03-09 NOTE — TELEPHONE ENCOUNTER
Call returned to patient, notified patient should be seen or urgent care per Dr. Wright.     Patient reports she did go to  at [facility] so no longer in need of appointment.

## 2022-08-10 ENCOUNTER — OFFICE VISIT (OUTPATIENT)
Dept: FAMILY MEDICINE | Facility: OTHER | Age: 27
End: 2022-08-10
Attending: FAMILY MEDICINE
Payer: COMMERCIAL

## 2022-08-10 VITALS
RESPIRATION RATE: 18 BRPM | BODY MASS INDEX: 18.05 KG/M2 | TEMPERATURE: 97.2 F | HEIGHT: 67 IN | OXYGEN SATURATION: 98 % | DIASTOLIC BLOOD PRESSURE: 60 MMHG | WEIGHT: 115 LBS | HEART RATE: 109 BPM | SYSTOLIC BLOOD PRESSURE: 90 MMHG

## 2022-08-10 DIAGNOSIS — Z71.89 ACP (ADVANCE CARE PLANNING): ICD-10-CM

## 2022-08-10 DIAGNOSIS — R00.2 PALPITATIONS: ICD-10-CM

## 2022-08-10 DIAGNOSIS — Z31.61 PROCREATIVE COUNSELING AND ADVICE USING NATURAL FAMILY PLANNING: ICD-10-CM

## 2022-08-10 DIAGNOSIS — N94.3 PREMENSTRUAL SYNDROME: ICD-10-CM

## 2022-08-10 DIAGNOSIS — Z00.00 ROUTINE GENERAL MEDICAL EXAMINATION AT A HEALTH CARE FACILITY: Primary | ICD-10-CM

## 2022-08-10 DIAGNOSIS — B35.4 TINEA CORPORIS: ICD-10-CM

## 2022-08-10 PROCEDURE — 99395 PREV VISIT EST AGE 18-39: CPT | Performed by: FAMILY MEDICINE

## 2022-08-10 PROCEDURE — 99213 OFFICE O/P EST LOW 20 MIN: CPT | Mod: 25 | Performed by: FAMILY MEDICINE

## 2022-08-10 RX ORDER — CLINDAMYCIN PHOSPHATE 10 MG/G
GEL TOPICAL
COMMUNITY
Start: 2021-09-13 | End: 2022-08-10

## 2022-08-10 RX ORDER — METHYLPREDNISOLONE 4 MG
TABLET, DOSE PACK ORAL
COMMUNITY
Start: 2022-03-09

## 2022-08-10 RX ORDER — AMOXICILLIN 500 MG/1
CAPSULE ORAL
COMMUNITY
Start: 2022-03-09 | End: 2022-08-10

## 2022-08-10 RX ORDER — KETOCONAZOLE 20 MG/G
CREAM TOPICAL DAILY
Qty: 15 G | Refills: 1 | Status: SHIPPED | OUTPATIENT
Start: 2022-08-10

## 2022-08-10 RX ORDER — ADAPALENE GEL USP, 0.3% 3 MG/G
GEL TOPICAL
COMMUNITY
Start: 2021-09-13

## 2022-08-10 SDOH — HEALTH STABILITY: PHYSICAL HEALTH: ON AVERAGE, HOW MANY MINUTES DO YOU ENGAGE IN EXERCISE AT THIS LEVEL?: 0 MIN

## 2022-08-10 SDOH — HEALTH STABILITY: PHYSICAL HEALTH: ON AVERAGE, HOW MANY DAYS PER WEEK DO YOU ENGAGE IN MODERATE TO STRENUOUS EXERCISE (LIKE A BRISK WALK)?: 0 DAYS

## 2022-08-10 ASSESSMENT — ENCOUNTER SYMPTOMS
FREQUENCY: 1
HEARTBURN: 1
HEADACHES: 1
HEMATURIA: 0
DIARRHEA: 0
MYALGIAS: 0
SORE THROAT: 0
FEVER: 0
NAUSEA: 0
PARESTHESIAS: 0
EYE PAIN: 0
HEMATOCHEZIA: 0
WEAKNESS: 0
COUGH: 0
CHILLS: 0
ARTHRALGIAS: 0
PALPITATIONS: 1
ABDOMINAL PAIN: 0
JOINT SWELLING: 0
DYSURIA: 0
CONSTIPATION: 0
BREAST MASS: 0
DIZZINESS: 0
NERVOUS/ANXIOUS: 0
SHORTNESS OF BREATH: 0

## 2022-08-10 ASSESSMENT — LIFESTYLE VARIABLES
SKIP TO QUESTIONS 9-10: 1
AUDIT-C TOTAL SCORE: 1
HOW OFTEN DO YOU HAVE SIX OR MORE DRINKS ON ONE OCCASION: NEVER
HOW OFTEN DO YOU HAVE A DRINK CONTAINING ALCOHOL: MONTHLY OR LESS
HOW MANY STANDARD DRINKS CONTAINING ALCOHOL DO YOU HAVE ON A TYPICAL DAY: 1 OR 2

## 2022-08-10 ASSESSMENT — PAIN SCALES - GENERAL: PAINLEVEL: NO PAIN (0)

## 2022-08-10 NOTE — COMMUNITY RESOURCES LIST (ENGLISH)
08/10/2022   [facility] - Outpatient Clinics  N/A  For questions about this resource list or additional care needs, please contact your primary care clinic or care manager.  Phone: [phone]   Email: N/A   Address: [address] [place] [address]   Hours: N/A        Exercise and Recreation       Gym or workout facility  1  [employer] - [place] Distance: 3.65 miles      COVID-19 Status: Regular Operations   [address] [place] [address]  Language: English  Hours: Mon - Sun Open 24 Hours  Fees: Insurance, Self Pay   Phone: [phone]) [phone] Email: [email or web address] Website: [email or web address]     2  [facility] Distance: 3.89 miles      COVID-19 Status: Regular Operations   [address] [place] [address]  Language: English  Hours: Mon - Fri 5:00 AM - 7:00 PM , Sat 9:00 AM - 5:00 PM  Fees: Free, Self Pay   Phone: [phone] [phone] Email: [email or web address] Website: http://www.[email or web address]/          Important Numbers & Websites       Emergency Services   911  City Services   311  Poison Control   (775) 506-6263  Suicide Prevention Lifeline   (335) 369-5140 (TALK)  Child Abuse Hotline   (792) 515-8563 (4-A-Child)  Sexual Assault Hotline   (413) 811-8464 (HOPE)  National Runaway Safeline   (269) 863-3718 (RUNAWAY)  All-Options Talkline   (149) 792-3375  Substance Abuse Referral   (225) 951-3137 (HELP)

## 2022-08-10 NOTE — NURSING NOTE
"Chief Complaint   Patient presents with     Physical       Initial BP 90/60   Pulse 109   Temp 97.2  F (36.2  C) (Tympanic)   Resp 18   Ht 1.702 m (5' 7\")   Wt 52.2 kg (115 lb)   SpO2 98%   BMI 18.01 kg/m   Estimated body mass index is 18.01 kg/m  as calculated from the following:    Height as of this encounter: 1.702 m (5' 7\").    Weight as of this encounter: 52.2 kg (115 lb).  Medication Reconciliation: complete  Sada Brenner LPN    "

## 2022-08-10 NOTE — COMMUNITY RESOURCES LIST (ENGLISH)
08/10/2022   [facility] - Outpatient Clinics  N/A  For questions about this resource list or additional care needs, please contact your primary care clinic or care manager.  Phone: [phone]   Email: N/A   Address: [address] [place] [address]   Hours: N/A        Exercise and Recreation       Gym or workout facility  1  [employer] [facility] [place] Distance: 3.65 miles      COVID-19 Status: Regular Operations   [address] [place] [address]  Language: English  Hours: Mon - Sun Open 24 Hours  Fees: Insurance, Self Pay   Phone: [phone] Email: [email or web address] Website: [email or web address]     2  [facility] Distance: 3.89 miles      COVID-19 Status: Regular Operations   [address] [place] [address]  Language: English  Hours: Mon - Fri 5:00 AM - 7:00 PM , Sat 9:00 AM - 5:00 PM  Fees: Free, Self Pay   Phone: [phone] Email: [email or web address]@[email or web address] Website: http://www.[employer].org/          Important Numbers & Websites       Emergency Services   911  City Services   311  Poison Control   (447) 352-6004  Suicide Prevention Lifeline   (637) 430-6215 (TALK)  Child Abuse Hotline   (773) 151-1065 (4-A-Child)  Sexual Assault Hotline   (158) 347-2605 (HOPE)  National Runaway Safeline   (111) 758-7636 (RUNAWAY)  All-Options Talkline   (461) 517-8390  Substance Abuse Referral   (667) 176-7194 (HELP)

## 2022-08-10 NOTE — PROGRESS NOTES
SUBJECTIVE:   CC: Evangelina Otero is an 27 year old woman who presents for preventive health visit.        Patient has been advised of split billing requirements and indicates understanding: Yes  Healthy Habits:     Getting at least 3 servings of Calcium per day:  NO    Bi-annual eye exam:  NO    Dental care twice a year:  Yes    Sleep apnea or symptoms of sleep apnea:  None    Diet:  Regular (no restrictions)    Frequency of exercise:  None    Taking medications regularly:  Yes    Medication side effects:  Not applicable    PHQ-2 Total Score: 0    Additional concerns today:  Yes      Rash      Duration: 2 fingers    Description  Location: right 3rd and 4th digits  Itching: mild    Intensity:  mild    Accompanying signs and symptoms: above    History (similar episodes/previous evaluation): had previously    Precipitating or alleviating factors:  New exposures:  Was doing a lot of typing  Recent travel: no      Therapies tried and outcome: none    Vaginal Symptoms      Duration: had bartholins cyst last year marsupialized    Description  Regular menses    Intensity:  mild    Accompanying signs and symptoms (fever/dysuria/abdominal or back pain): sister has PCOS, wondering if she has it    History  Sexually active: yes, single partner, contraception - not needed  Possibility of pregnancy: No  Recent antibiotic use: no     Precipitating or alleviating factors: None    Therapies tried and outcome: stopped taking vitamins    Palpitations      Duration: few months- year    Description (location/character/radiation): occurs weekly or every 2 wks    Intensity:  mild    Accompanying signs and symptoms: uncomfortable symptoms    History (similar episodes/previous evaluation): None    Precipitating or alleviating factors: None    Therapies tried and outcome: None       Today's PHQ-2 Score:   PHQ-2 ( 1999 [employer]) 8/10/2022   Q1: Little interest or pleasure in doing things 0   Q2: Feeling down, depressed or hopeless 0   PHQ-2  Score 0   PHQ-2 Total Score (12-17 Years)- Positive if 3 or more points; Administer PHQ-A if positive -   Q1: Little interest or pleasure in doing things Not at all   Q2: Feeling down, depressed or hopeless Not at all   PHQ-2 Score 0       Abuse: Current or Past (Physical, Sexual or Emotional) - No  Do you feel safe in your environment? Yes    Have you ever done Advance Care Planning? (For example, a Health Directive, POLST, or a discussion with a medical provider or your loved ones about your wishes): No, advance care planning information given to patient to review.  Advanced care planning was discussed at today's visit.    Social History     Tobacco Use     Smoking status: Never Smoker     Smokeless tobacco: Never Used   Substance Use Topics     Alcohol use: Yes     Comment: occasional     If you drink alcohol do you typically have >3 drinks per day or >7 drinks per week? No    Alcohol Use 8/10/2022   Prescreen: >3 drinks/day or >7 drinks/week? No   Prescreen: >3 drinks/day or >7 drinks/week? -       Reviewed orders with patient.  Reviewed health maintenance and updated orders accordingly - Yes  Lab work is in process  Labs reviewed in EPIC  BP Readings from Last 3 Encounters:   08/10/22 90/60   10/26/20 120/60   10/16/20 102/60    Wt Readings from Last 3 Encounters:   08/10/22 52.2 kg (115 lb)   10/26/20 54.3 kg (119 lb 9.6 oz)   10/16/20 50.3 kg (111 lb)                  Patient Active Problem List   Diagnosis     Hypovitaminosis D     ACP (advance care planning)     Multiple atypical nevi     Premenstrual syndrome     Infective otitis externa, right     Acne vulgaris     Breast tenderness     Past Surgical History:   Procedure Laterality Date     FOOT SURGERY  [id number]    bunions/hammer toes/reconstruction to right foot     MARSUPIALIZATION BARTHOLIN CYST Left 10/2021    done in [place], [place]     SIGMOIDOSCOPY,DIAGNOSTIC  03/16/2007     UPPER GI ENDOSCOPY  03/16/2007       Social History     Tobacco Use      Smoking status: Never Smoker     Smokeless tobacco: Never Used   Substance Use Topics     Alcohol use: Yes     Comment: occasional     Family History   Problem Relation Age of Onset     Asthma Mother      Gastrointestinal Disease Mother         ulcers     Immunodeficiency Mother         fibromyalgia     Allergies Father      Autoimmune Disease Sister         ?  TRINITY positive     Polycystic ovary syndrome Sister      Cancer Maternal Grandmother         ovarian?     Diabetes Maternal Grandmother      Heart Failure Maternal Grandmother 80     Liver Disease Maternal Grandmother      Lung Cancer Maternal Grandfather         +tobacco     Unknown/Adopted Paternal Grandmother         don't see her often     Cardiovascular Paternal Grandfather      Liver Disease Paternal Grandfather      Alcoholism Paternal Grandfather          Current Outpatient Medications   Medication Sig Dispense Refill     adapalene (DIFFERIN) 0.3 % external gel APPLY TO FULL FACE AT BEDTIME AFTER CLEANSING.       Calcium Carb-Cholecalciferol (CALCIUM 500 +D PO)        Cholecalciferol (VITAMIN D) 125 MCG (5000 UT) CAPS Take 1 capsule by mouth daily       methylPREDNISolone (MEDROL DOSEPAK) 4 MG tablet therapy pack TAKE 6 TABLETS ON DAY 1 AS DIRECTED ON PACKAGE AND DECREASE BY 1 TAB EACH DAY FOR A TOTAL OF 6 DAYS       Multiple Vitamins-Minerals (OPTIVITE P.M.T.) TABS Take 3 tablets by mouth 2 times daily 180 tablet 3     No Known Allergies    Breast Cancer Screening:    FHS-7: No flowsheet data found.  Patient under 40 years of age: Routine Mammogram Screening not recommended.   Pertinent mammograms are reviewed under the imaging tab.    History of abnormal Pap smear: NO - age 21-29 PAP every 3 years recommended  PAP / HPV 10/15/2020   PAP (Historical) NIL     Reviewed and updated as needed this visit by clinical staff   Tobacco  Allergies  Meds   Med Hx  Surg Hx  Fam Hx  Soc Hx          Reviewed and updated as needed this visit by Provider          "          Past Medical History:   Diagnosis Date     Bartholin gland cyst 10/2021    s/p marsupilization     Chronic abdominal pain     Probable IBS     Chronic urticaria      Contact dermatitis and other eczema, due to unspecified cause      Hypermetropia     minimum hyperopia. Does not need glasses     Unspecified constipation      Vomiting alone     Recurrent abdominal problems. Milk free diet. Tigan suppositories given.      Past Surgical History:   Procedure Laterality Date     FOOT SURGERY  [id number]    bunions/hammer toes/reconstruction to right foot     MARSUPIALIZATION BARTHOLIN CYST Left 10/2021    done in [place], [place]     SIGMOIDOSCOPY,DIAGNOSTIC  2007     UPPER GI ENDOSCOPY  2007     OB History    Para Term  AB Living   0 0 0 0 0 0   SAB IAB Ectopic Multiple Live Births   0 0 0 0 0       Review of Systems  CONSTITUTIONAL: NEGATIVE for fever, chills, change in weight  INTEGUMENTARU/SKIN: NEGATIVE for worrisome rashes, moles or lesions  EYES: NEGATIVE for vision changes or irritation  ENT: NEGATIVE for ear, mouth and throat problems  RESP: NEGATIVE for significant cough or SOB  BREAST: NEGATIVE for masses, tenderness or discharge  CV: NEGATIVE for chest pain, palpitations or peripheral edema  GI: NEGATIVE for nausea, abdominal pain, heartburn, or change in bowel habits  : NEGATIVE for unusual urinary or vaginal symptoms. Periods are regular.  MUSCULOSKELETAL: NEGATIVE for significant arthralgias or myalgia  NEURO: NEGATIVE for weakness, dizziness or paresthesias  PSYCHIATRIC: NEGATIVE for changes in mood or affect     OBJECTIVE:   BP 90/60   Pulse 109   Temp 97.2  F (36.2  C) (Tympanic)   Resp 18   Ht 1.702 m (5' 7\")   Wt 52.2 kg (115 lb)   SpO2 98%   BMI 18.01 kg/m    Physical Exam  GENERAL: healthy, alert and no distress  EYES: Eyes grossly normal to inspection, PERRL and conjunctivae and sclerae normal  HENT: ear canals and TM's normal, nose and mouth without ulcers " or lesions  NECK: no adenopathy, no asymmetry, masses, or scars and thyroid normal to palpation  RESP: lungs clear to auscultation - no rales, rhonchi or wheezes  CV: regular rate and rhythm, normal S1 S2, no S3 or S4, no murmur, click or rub, no peripheral edema and peripheral pulses strong  ABDOMEN: soft, nontender, no hepatosplenomegaly, no masses and bowel sounds normal   (female): deferred  MS: no gross musculoskeletal defects noted, no edema  SKIN: no suspicious lesions or rashes  NEURO: Normal strength and tone, mentation intact and speech normal  PSYCH: mentation appears normal, affect normal/bright    Diagnostic Test Results:  Labs reviewed in Epic  No results found for any visits on 08/10/22.    ASSESSMENT/PLAN:   (Z00.00) Routine general medical examination at a health care facility  (primary encounter diagnosis)  Comment:   Plan: follow-up 1 year    (Z31.61) Procreative counseling and advice using natural family planning  Comment:   Plan: Luteinizing Hormone, Adult, Follicle         stimulating hormone, Testosterone Free and         Total, DHEA sulfate, Androstenedione,         Prolactin, Glucose, TSH with free T4 reflex,         Insulin level, 17 OH progesterone, Mullerian         Hormone Antibody        Labs on day 3 and follow-up 2-3 wks later    (N94.3) Premenstrual syndrome  Comment:   Plan: Luteinizing Hormone, Adult, Follicle         stimulating hormone, Testosterone Free and         Total, DHEA sulfate, Androstenedione,         Prolactin, Glucose, TSH with free T4 reflex,         Insulin level, 17 OH progesterone, Mullerian         Hormone Antibody        Start optivite vitamins again    (B35.4) Tinea corporis  Comment:   Plan: ketoconazole (NIZORAL) 2 % external cream        Trial nizoral    (R00.2) Palpitations  Comment:   Plan: Leadless EKG Monitor 3 to 7 Days          (Z71.89) ACP (advance care planning)  Comment:   Plan: paperwork given    COUNSELING:  Reviewed preventive health  "counseling, as reflected in patient instructions  Special attention given to:        Regular exercise       Healthy diet/nutrition       Vision screening       Hearing screening       Family planning       Consider Hep C screening for all patients one time for ages 18-79 years       HIV screeninx in teen years, 1x in adult years, and at intervals if high risk       Advance Care Planning    Estimated body mass index is 18.01 kg/m  as calculated from the following:    Height as of this encounter: 1.702 m (5' 7\").    Weight as of this encounter: 52.2 kg (115 lb).      She reports that she has never smoked. She has never used smokeless tobacco.      Counseling Resources:  ATP IV Guidelines  Pooled Cohorts Equation Calculator  Breast Cancer Risk Calculator  BRCA-Related Cancer Risk Assessment: FHS-7 Tool  FRAX Risk Assessment  ICSI Preventive Guidelines  Dietary Guidelines for Americans, 2010  USDA's MyPlate  ASA Prophylaxis  Lung CA Screening    Meena Wright MD  [facility] - [place]  "

## 2022-08-10 NOTE — PATIENT INSTRUCTIONS
Labs on day 3 of your next period before 8am and fasting -- if during the week, [facility] app[facility]

## 2022-09-04 ENCOUNTER — HEALTH MAINTENANCE LETTER (OUTPATIENT)
Age: 27
End: 2022-09-04

## 2023-05-03 ENCOUNTER — NURSE TRIAGE (OUTPATIENT)
Dept: FAMILY MEDICINE | Facility: OTHER | Age: 28
End: 2023-05-03

## 2023-05-03 ENCOUNTER — TELEPHONE (OUTPATIENT)
Dept: FAMILY MEDICINE | Facility: OTHER | Age: 28
End: 2023-05-03

## 2023-05-03 NOTE — TELEPHONE ENCOUNTER
"Disposition: See in Office today.    Patient called with symptoms of mild vaginal pain and swelling. Patient states pain has been present for the past couple days and she noticed swelling today. Patient states she has a history of Bartholin's cyst. Patient denies any fever or vaginal discharge. Per protocol patient to be seen in office today. Writer offered to send overbook to PCP. Patient declined stating she will be seen in UC/ED.     Reason for Disposition    MILD-MODERATE pain and present > 24 hours  (Exception: Chronic pain.)    Additional Information    Negative: Sounds like a life-threatening emergency to the triager    Negative: Followed a genital area injury (e.g., vagina, vulva)    Negative: Vaginal bleeding is main symptom    Negative: Vaginal discharge is main symptom    Negative: Pain or burning with passing urine (urination) is main symptom    Negative: Menstrual cramps is main symptom    Negative: Abdomen pain is main symptom    Negative: Pubic lice suspected    Negative: Itching or rash of external female genital area (vulva)    Negative: Labor suspected    Negative: Patient sounds very sick or weak to the triager    Negative: SEVERE pain and not improved 2 hours after pain medicine    Negative: Genital area looks infected (e.g., draining sore, spreading redness) and fever    Negative: Something is hanging out of the vagina and can't easily be pushed back inside    Answer Assessment - Initial Assessment Questions  1. SYMPTOM: \"What's the main symptom you're concerned about?\" (e.g., pain, itching, dryness)      Vaginal swelling and tenderness  2. LOCATION: \"Where is the  Swelling and tenderness located?\" (e.g., inside/outside, left/right)      Inside inner labia  3. ONSET: \"When did the  Swelling and tenderness  start?\"      Swelling was noticed today  4. PAIN: \"Is there any pain?\" If Yes, ask: \"How bad is it?\" (Scale: 1-10; mild, moderate, severe)      Yes, mild  5. ITCHING: \"Is there any itching?\" " "If Yes, ask: \"How bad is it?\" (Scale: 1-10; mild, moderate, severe)      no  6. CAUSE: \"What do you think is causing the discharge?\" \"Have you had the same problem before? What happened then?\"      No discharge  7. OTHER SYMPTOMS: \"Do you have any other symptoms?\" (e.g., fever, itching, vaginal bleeding, pain with urination, injury to genital area, vaginal foreign body)      no  8. PREGNANCY: \"Is there any chance you are pregnant?\" \"When was your last menstrual period?\"      no    Protocols used: VAGINAL SYMPTOMS-A-[place]      "

## 2023-10-01 ENCOUNTER — HEALTH MAINTENANCE LETTER (OUTPATIENT)
Age: 28
End: 2023-10-01

## 2024-11-24 ENCOUNTER — HEALTH MAINTENANCE LETTER (OUTPATIENT)
Age: 29
End: 2024-11-24